# Patient Record
Sex: FEMALE | Race: WHITE
[De-identification: names, ages, dates, MRNs, and addresses within clinical notes are randomized per-mention and may not be internally consistent; named-entity substitution may affect disease eponyms.]

---

## 2021-03-24 ENCOUNTER — HOSPITAL ENCOUNTER (OUTPATIENT)
Age: 31
End: 2021-03-24
Payer: COMMERCIAL

## 2021-03-24 VITALS — BODY MASS INDEX: 28.9 KG/M2

## 2021-03-24 DIAGNOSIS — N89.8: ICD-10-CM

## 2021-03-24 DIAGNOSIS — Z12.4: Primary | ICD-10-CM

## 2021-03-24 PROCEDURE — 87205 SMEAR GRAM STAIN: CPT

## 2021-03-24 PROCEDURE — 87624 HPV HI-RISK TYP POOLED RSLT: CPT

## 2021-03-24 PROCEDURE — G0145 SCR C/V CYTO,THINLAYER,RESCR: HCPCS

## 2021-03-24 PROCEDURE — 88175 CYTOPATH C/V AUTO FLUID REDO: CPT

## 2021-03-24 PROCEDURE — 87070 CULTURE OTHR SPECIMN AEROBIC: CPT

## 2023-03-20 PROBLEM — G43.909 MIGRAINE: Status: ACTIVE | Noted: 2023-03-20

## 2023-03-20 PROBLEM — R63.5 WEIGHT GAIN: Status: ACTIVE | Noted: 2023-03-20

## 2023-03-20 PROBLEM — F43.23 SITUATIONAL MIXED ANXIETY AND DEPRESSIVE DISORDER: Status: ACTIVE | Noted: 2023-03-20

## 2023-03-20 PROBLEM — M94.0 COSTOCHONDRITIS, ACUTE: Status: ACTIVE | Noted: 2023-03-20

## 2023-03-20 PROBLEM — B00.9 HERPES SIMPLEX: Status: ACTIVE | Noted: 2023-03-20

## 2023-03-20 PROBLEM — R53.83 MALAISE AND FATIGUE: Status: ACTIVE | Noted: 2023-03-20

## 2023-03-20 PROBLEM — R53.81 MALAISE AND FATIGUE: Status: ACTIVE | Noted: 2023-03-20

## 2023-03-20 PROBLEM — E78.9 BORDERLINE HIGH CHOLESTEROL: Status: ACTIVE | Noted: 2023-03-20

## 2023-03-20 RX ORDER — ACYCLOVIR 400 MG/1
400 TABLET ORAL
COMMUNITY
Start: 2020-01-22

## 2023-03-20 RX ORDER — CITALOPRAM 10 MG/1
1 TABLET ORAL DAILY
COMMUNITY
Start: 2020-11-05 | End: 2023-03-28 | Stop reason: SDUPTHER

## 2023-03-28 DIAGNOSIS — F43.23 SITUATIONAL MIXED ANXIETY AND DEPRESSIVE DISORDER: Primary | ICD-10-CM

## 2023-03-28 RX ORDER — CITALOPRAM 10 MG/1
10 TABLET ORAL DAILY
Qty: 30 TABLET | Refills: 2 | Status: SHIPPED | OUTPATIENT
Start: 2023-03-28 | End: 2023-06-08 | Stop reason: SDUPTHER

## 2023-06-08 DIAGNOSIS — F43.23 SITUATIONAL MIXED ANXIETY AND DEPRESSIVE DISORDER: ICD-10-CM

## 2023-06-08 RX ORDER — CITALOPRAM 10 MG/1
10 TABLET ORAL DAILY
Qty: 30 TABLET | Refills: 0 | Status: SHIPPED | OUTPATIENT
Start: 2023-06-08 | End: 2023-07-13 | Stop reason: SDUPTHER

## 2023-07-13 ENCOUNTER — OFFICE VISIT (OUTPATIENT)
Dept: PRIMARY CARE | Facility: CLINIC | Age: 33
End: 2023-07-13
Payer: COMMERCIAL

## 2023-07-13 VITALS
HEIGHT: 62 IN | BODY MASS INDEX: 36.44 KG/M2 | HEART RATE: 74 BPM | SYSTOLIC BLOOD PRESSURE: 108 MMHG | WEIGHT: 198 LBS | DIASTOLIC BLOOD PRESSURE: 74 MMHG

## 2023-07-13 DIAGNOSIS — R53.81 MALAISE AND FATIGUE: ICD-10-CM

## 2023-07-13 DIAGNOSIS — R53.83 MALAISE AND FATIGUE: ICD-10-CM

## 2023-07-13 DIAGNOSIS — R79.89 LOW VITAMIN B12 LEVEL: ICD-10-CM

## 2023-07-13 DIAGNOSIS — E55.9 VITAMIN D DEFICIENCY: ICD-10-CM

## 2023-07-13 DIAGNOSIS — F43.23 SITUATIONAL MIXED ANXIETY AND DEPRESSIVE DISORDER: ICD-10-CM

## 2023-07-13 DIAGNOSIS — Z00.00 ENCOUNTER FOR WELLNESS EXAMINATION IN ADULT: Primary | ICD-10-CM

## 2023-07-13 DIAGNOSIS — E66.09 CLASS 2 OBESITY DUE TO EXCESS CALORIES WITHOUT SERIOUS COMORBIDITY WITH BODY MASS INDEX (BMI) OF 36.0 TO 36.9 IN ADULT: ICD-10-CM

## 2023-07-13 PROBLEM — M94.0 COSTOCHONDRITIS, ACUTE: Status: RESOLVED | Noted: 2023-03-20 | Resolved: 2023-07-13

## 2023-07-13 PROBLEM — E66.812 CLASS 2 OBESITY DUE TO EXCESS CALORIES WITHOUT SERIOUS COMORBIDITY WITH BODY MASS INDEX (BMI) OF 36.0 TO 36.9 IN ADULT: Status: ACTIVE | Noted: 2023-07-13

## 2023-07-13 LAB
ALANINE AMINOTRANSFERASE (SGPT) (U/L) IN SER/PLAS: 18 U/L (ref 7–45)
ALBUMIN (G/DL) IN SER/PLAS: 4.5 G/DL (ref 3.4–5)
ALKALINE PHOSPHATASE (U/L) IN SER/PLAS: 57 U/L (ref 33–110)
ANION GAP IN SER/PLAS: 11 MMOL/L (ref 10–20)
ASPARTATE AMINOTRANSFERASE (SGOT) (U/L) IN SER/PLAS: 20 U/L (ref 9–39)
BASOPHILS (10*3/UL) IN BLOOD BY AUTOMATED COUNT: 0.07 X10E9/L (ref 0–0.1)
BASOPHILS/100 LEUKOCYTES IN BLOOD BY AUTOMATED COUNT: 0.9 % (ref 0–2)
BILIRUBIN TOTAL (MG/DL) IN SER/PLAS: 0.3 MG/DL (ref 0–1.2)
CALCIDIOL (25 OH VITAMIN D3) (NG/ML) IN SER/PLAS: 31 NG/ML
CALCIUM (MG/DL) IN SER/PLAS: 9.5 MG/DL (ref 8.6–10.3)
CARBON DIOXIDE, TOTAL (MMOL/L) IN SER/PLAS: 26 MMOL/L (ref 21–32)
CHLORIDE (MMOL/L) IN SER/PLAS: 104 MMOL/L (ref 98–107)
CHOLESTEROL (MG/DL) IN SER/PLAS: 219 MG/DL (ref 0–199)
CHOLESTEROL IN HDL (MG/DL) IN SER/PLAS: 67 MG/DL
CHOLESTEROL/HDL RATIO: 3.3
COBALAMIN (VITAMIN B12) (PG/ML) IN SER/PLAS: 358 PG/ML (ref 211–911)
CREATININE (MG/DL) IN SER/PLAS: 0.7 MG/DL (ref 0.5–1.05)
EOSINOPHILS (10*3/UL) IN BLOOD BY AUTOMATED COUNT: 0.33 X10E9/L (ref 0–0.7)
EOSINOPHILS/100 LEUKOCYTES IN BLOOD BY AUTOMATED COUNT: 4.2 % (ref 0–6)
ERYTHROCYTE DISTRIBUTION WIDTH (RATIO) BY AUTOMATED COUNT: 12.8 % (ref 11.5–14.5)
ERYTHROCYTE MEAN CORPUSCULAR HEMOGLOBIN CONCENTRATION (G/DL) BY AUTOMATED: 32.1 G/DL (ref 32–36)
ERYTHROCYTE MEAN CORPUSCULAR VOLUME (FL) BY AUTOMATED COUNT: 89 FL (ref 80–100)
ERYTHROCYTES (10*6/UL) IN BLOOD BY AUTOMATED COUNT: 4.97 X10E12/L (ref 4–5.2)
ESTIMATED AVERAGE GLUCOSE FOR HBA1C: 105 MG/DL
FERRITIN (UG/LL) IN SER/PLAS: 81 UG/L (ref 8–150)
FOLATE (NG/ML) IN SER/PLAS: 16.6 NG/ML
GFR FEMALE: >90 ML/MIN/1.73M2
GLUCOSE (MG/DL) IN SER/PLAS: 86 MG/DL (ref 74–99)
HEMATOCRIT (%) IN BLOOD BY AUTOMATED COUNT: 44.2 % (ref 36–46)
HEMOGLOBIN (G/DL) IN BLOOD: 14.2 G/DL (ref 12–16)
HEMOGLOBIN A1C/HEMOGLOBIN TOTAL IN BLOOD: 5.3 %
IMMATURE GRANULOCYTES/100 LEUKOCYTES IN BLOOD BY AUTOMATED COUNT: 0.8 % (ref 0–0.9)
INSULIN: 20 UIU/ML (ref 3–25)
IRON (UG/DL) IN SER/PLAS: 72 UG/DL (ref 35–150)
IRON BINDING CAPACITY (UG/DL) IN SER/PLAS: 423 UG/DL (ref 240–445)
IRON SATURATION (%) IN SER/PLAS: 17 % (ref 25–45)
LDL: 132 MG/DL (ref 0–99)
LEUKOCYTES (10*3/UL) IN BLOOD BY AUTOMATED COUNT: 7.9 X10E9/L (ref 4.4–11.3)
LYMPHOCYTES (10*3/UL) IN BLOOD BY AUTOMATED COUNT: 2.38 X10E9/L (ref 1.2–4.8)
LYMPHOCYTES/100 LEUKOCYTES IN BLOOD BY AUTOMATED COUNT: 30.2 % (ref 13–44)
MONOCYTES (10*3/UL) IN BLOOD BY AUTOMATED COUNT: 0.69 X10E9/L (ref 0.1–1)
MONOCYTES/100 LEUKOCYTES IN BLOOD BY AUTOMATED COUNT: 8.8 % (ref 2–10)
NEUTROPHILS (10*3/UL) IN BLOOD BY AUTOMATED COUNT: 4.35 X10E9/L (ref 1.2–7.7)
NEUTROPHILS/100 LEUKOCYTES IN BLOOD BY AUTOMATED COUNT: 55.1 % (ref 40–80)
PLATELETS (10*3/UL) IN BLOOD AUTOMATED COUNT: 354 X10E9/L (ref 150–450)
POTASSIUM (MMOL/L) IN SER/PLAS: 4.5 MMOL/L (ref 3.5–5.3)
PROTEIN TOTAL: 7.3 G/DL (ref 6.4–8.2)
SODIUM (MMOL/L) IN SER/PLAS: 136 MMOL/L (ref 136–145)
THYROTROPIN (MIU/L) IN SER/PLAS BY DETECTION LIMIT <= 0.05 MIU/L: 6.23 MIU/L (ref 0.44–3.98)
THYROXINE (T4) (UG/DL) IN SER/PLAS: 6 UG/DL (ref 4.5–11.1)
THYROXINE (T4) FREE (NG/DL) IN SER/PLAS: 0.6 NG/DL (ref 0.61–1.12)
TRIGLYCERIDE (MG/DL) IN SER/PLAS: 99 MG/DL (ref 0–149)
UREA NITROGEN (MG/DL) IN SER/PLAS: 21 MG/DL (ref 6–23)
VLDL: 20 MG/DL (ref 0–40)

## 2023-07-13 PROCEDURE — 3008F BODY MASS INDEX DOCD: CPT | Performed by: PHYSICIAN ASSISTANT

## 2023-07-13 PROCEDURE — 1036F TOBACCO NON-USER: CPT | Performed by: PHYSICIAN ASSISTANT

## 2023-07-13 PROCEDURE — 99395 PREV VISIT EST AGE 18-39: CPT | Performed by: PHYSICIAN ASSISTANT

## 2023-07-13 RX ORDER — CITALOPRAM 10 MG/1
10 TABLET ORAL DAILY
Qty: 30 TABLET | Refills: 11 | Status: SHIPPED | OUTPATIENT
Start: 2023-07-13 | End: 2024-02-01 | Stop reason: SDUPTHER

## 2023-07-13 RX ORDER — ERGOCALCIFEROL 1.25 MG/1
50000 CAPSULE ORAL
Qty: 5 CAPSULE | Refills: 11 | Status: SHIPPED | OUTPATIENT
Start: 2023-07-13 | End: 2024-06-04 | Stop reason: SDUPTHER

## 2023-07-13 RX ORDER — LANOLIN ALCOHOL/MO/W.PET/CERES
1000 CREAM (GRAM) TOPICAL DAILY
Qty: 30 TABLET | Refills: 11 | Status: SHIPPED | OUTPATIENT
Start: 2023-07-13 | End: 2024-06-04 | Stop reason: SDUPTHER

## 2023-07-13 ASSESSMENT — ENCOUNTER SYMPTOMS
RHINORRHEA: 0
ABDOMINAL PAIN: 0
NUMBNESS: 0
SHORTNESS OF BREATH: 0
EYE DISCHARGE: 0
VOMITING: 0
BACK PAIN: 0
DIARRHEA: 0
TREMORS: 0
CHEST TIGHTNESS: 0
DIZZINESS: 0
NECK PAIN: 0
HEADACHES: 0
SORE THROAT: 0
COUGH: 0
BRUISES/BLEEDS EASILY: 0
CHILLS: 0
PALPITATIONS: 0
EYE REDNESS: 0
FATIGUE: 1
CONFUSION: 0
WHEEZING: 0
CONSTIPATION: 0
FEVER: 0
FREQUENCY: 0
NAUSEA: 0
WOUND: 0
SINUS PAIN: 0
FLANK PAIN: 0
SLEEP DISTURBANCE: 0

## 2023-07-13 ASSESSMENT — PATIENT HEALTH QUESTIONNAIRE - PHQ9
SUM OF ALL RESPONSES TO PHQ9 QUESTIONS 1 AND 2: 0
2. FEELING DOWN, DEPRESSED OR HOPELESS: NOT AT ALL
1. LITTLE INTEREST OR PLEASURE IN DOING THINGS: NOT AT ALL

## 2023-07-13 NOTE — PROGRESS NOTES
Subjective   Patient ID: Sandra Crouch is a 32 y.o. female who presents for annual wellness (Rev labs )    HPI  Wellness   -Labs   - med check   herpes -stable on acyclovir prn. - denies needing new script      mood  celexa - doing well     -Preventative Testing y  PAP - Following with GYN last PAP -2022- Lisette Teresa - Peg  Mammo- fam hx of breast Cancer but in older age - suggest age 40   DEXA - suggest 55   Colonoscopy - Suggest age 45-50   Fall - No July 2023   PHQ2 - No July 2023     -Other Providers   GYN - Lisette Teresa  EYE- visit set in Aug - follows annually   Dentist - every 6 months     -Vaccinations  Flu - declines  PNA- suggest age 65  Tdap -2018?  Hep B - low risk   MMR  - vaccine as a child   Shingles - suggest age 50  Covid - first 2 shots    Patient Active Problem List   Diagnosis    Borderline high cholesterol    Costochondritis, acute    Herpes simplex    Malaise and fatigue    Migraine    Situational mixed anxiety and depressive disorder    Weight gain       Review of Systems   Constitutional:  Positive for fatigue. Negative for chills and fever.   HENT:  Negative for congestion, rhinorrhea, sinus pain, sore throat and tinnitus.    Eyes:  Negative for discharge, redness and visual disturbance.   Respiratory:  Negative for cough, chest tightness, shortness of breath and wheezing.    Cardiovascular:  Negative for chest pain, palpitations and leg swelling.   Gastrointestinal:  Negative for abdominal pain, constipation, diarrhea, nausea and vomiting.   Endocrine: Negative for cold intolerance and heat intolerance.   Genitourinary:  Negative for flank pain, frequency and urgency.   Musculoskeletal:  Negative for back pain, gait problem and neck pain.   Skin:  Negative for rash and wound.   Neurological:  Negative for dizziness, tremors, syncope, numbness and headaches.   Hematological:  Does not bruise/bleed easily.   Psychiatric/Behavioral:  Negative for confusion, sleep disturbance  "and suicidal ideas.        Past Medical History:   Diagnosis Date    Encounter for gynecological examination (general) (routine) without abnormal findings     Pap test, as part of routine gynecological examination    Other conditions influencing health status     Menarche    Other specified postprocedural states     History of Papanicolaou smear    Personal history of other diseases of the female genital tract     History of abnormal cervical Papanicolaou smear       Past Surgical History:   Procedure Laterality Date    COLPOSCOPY  03/29/2013       Family History   Problem Relation Name Age of Onset    Thyroid disease Mother      No Known Problems Father      Emphysema Other      Breast cancer Other         Social History     Tobacco Use    Smoking status: Never    Smokeless tobacco: Never   Substance Use Topics    Alcohol use: Never    Drug use: Never       No Known Allergies    Current Outpatient Medications   Medication Sig Dispense Refill    acyclovir (Zovirax) 400 mg tablet Take 1 tablet (400 mg) by mouth. 1 TAB TID PRN      citalopram (CeleXA) 10 mg tablet Take 1 tablet (10 mg) by mouth once daily. 30 tablet 0     No current facility-administered medications for this visit.       Objective   /74 (BP Location: Left arm, Patient Position: Sitting)   Pulse 74   Ht 1.575 m (5' 2\")   Wt 89.8 kg (198 lb)   BMI 36.21 kg/m²     Physical Exam  Vitals reviewed.   Constitutional:       Appearance: Normal appearance. She is obese.   HENT:      Head: Normocephalic.      Right Ear: External ear normal.      Left Ear: External ear normal.      Nose: Nose normal. No congestion or rhinorrhea.      Mouth/Throat:      Mouth: Mucous membranes are moist.   Eyes:      Extraocular Movements: Extraocular movements intact.      Conjunctiva/sclera: Conjunctivae normal.      Pupils: Pupils are equal, round, and reactive to light.   Cardiovascular:      Rate and Rhythm: Normal rate and regular rhythm.      Pulses: Normal " pulses.   Pulmonary:      Effort: Pulmonary effort is normal.      Breath sounds: Normal breath sounds.   Abdominal:      General: Bowel sounds are normal.      Palpations: Abdomen is soft.      Tenderness: There is no abdominal tenderness. There is no right CVA tenderness or left CVA tenderness.   Musculoskeletal:         General: No tenderness. Normal range of motion.      Cervical back: Normal range of motion and neck supple. No tenderness.   Skin:     General: Skin is warm and dry.   Neurological:      General: No focal deficit present.      Mental Status: She is alert and oriented to person, place, and time.   Psychiatric:         Mood and Affect: Mood normal.         Behavior: Behavior normal.         Testing   Component      Latest Ref Rn 7/13/2023   WBC      4.4 - 11.3 x10E9/L 7.9    RBC      4.00 - 5.20 x10E12/L 4.97    HEMOGLOBIN      12.0 - 16.0 g/dL 14.2    HEMATOCRIT      36.0 - 46.0 % 44.2    MCV      80 - 100 fL 89    MCHC      32.0 - 36.0 g/dL 32.1    Platelets      150 - 450 x10E9/L 354    RED CELL DISTRIBUTION WIDTH      11.5 - 14.5 % 12.8    Neutrophils %      40.0 - 80.0 % 55.1    Immature Granulocytes %, Automated      0.0 - 0.9 % 0.8    Lymphocytes %      13.0 - 44.0 % 30.2    Monocytes %      2.0 - 10.0 % 8.8    Eosinophils %      0.0 - 6.0 % 4.2    Basophils %      0.0 - 2.0 % 0.9    Neutrophils Absolute      1.20 - 7.70 x10E9/L 4.35    Lymphocytes Absolute      1.20 - 4.80 x10E9/L 2.38    Monocytes Absolute      0.10 - 1.00 x10E9/L 0.69    Eosinophils Absolute      0.00 - 0.70 x10E9/L 0.33    Basophils Absolute      0.00 - 0.10 x10E9/L 0.07    Hemoglobin A1C      % 5.3    Estimated Average Glucose      MG/    Vitamin D, 25-Hydroxy, Total      ng/mL 31    FOLATE      >5.0 ng/mL 16.6    Vitamin B12      211 - 911 pg/mL 358          Impression    MDM    1) COMPLEXITY: MORE THAN 1 STABLE CHRONIC CONDITION ADDRESSED  2)DATA: TESTS INTERPRETED AND OR ORDERED, TOOK INDEPENDENT HISTORY OR  RECORDS REVIEWED  3)RISK: MODERATE RISK DUE TO NATURE OF MEDICAL CONDITIONS/COMORBIDITY OR MEDICATIONS ORDERED OR SURGICAL OR PROCEDURE REFERRAL, .       Reviewed labs and Testing on file   Patient to follow diet low in cholesterol, fat, and sodium.    Patient is advised to increase Exercise.  Patient is recommended to lose weight.  Reviewed Meds and discussed common side effects  Continue as directed   Vit D - start weekly supplement and repeat in 3-6 mo  B12 - start daily supplement and repeat in 3-6 mo  Cont with specialists   Pt to call for remaining lab results that were not back - CMP and lipid   Aim for wellness in 1 year if approp based on labs   Patient is strongly advised to be compliant with recommendations.    Return to Clinic sooner if needed.  Patient denies further questions/concerns at this time     Assessment/Plan   Problem List Items Addressed This Visit       Malaise and fatigue    Situational mixed anxiety and depressive disorder    Relevant Medications    citalopram (CeleXA) 10 mg tablet    Low vitamin B12 level    Relevant Medications    cyanocobalamin (Vitamin B-12) 1,000 mcg tablet    Other Relevant Orders    Vitamin B12    Vitamin D deficiency    Relevant Medications    ergocalciferol (Vitamin D-2) 1.25 MG (63654 UT) capsule    Other Relevant Orders    Vitamin D, Total    Class 2 obesity due to excess calories without serious comorbidity with body mass index (BMI) of 36.0 to 36.9 in adult     Other Visit Diagnoses       Encounter for wellness examination in adult    -  Primary             FU in 6 mo with labs at [facility] non fasting

## 2023-07-14 LAB — TRIIODOTHYRONINE (T3) (NG/DL) IN SER/PLAS: 148 NG/DL (ref 60–200)

## 2024-02-01 ENCOUNTER — OFFICE VISIT (OUTPATIENT)
Dept: PRIMARY CARE | Facility: CLINIC | Age: 34
End: 2024-02-01
Payer: COMMERCIAL

## 2024-02-01 VITALS
SYSTOLIC BLOOD PRESSURE: 103 MMHG | HEIGHT: 62 IN | WEIGHT: 194 LBS | BODY MASS INDEX: 35.7 KG/M2 | DIASTOLIC BLOOD PRESSURE: 68 MMHG | OXYGEN SATURATION: 98 % | HEART RATE: 78 BPM

## 2024-02-01 DIAGNOSIS — E78.00 HYPERCHOLESTEREMIA: ICD-10-CM

## 2024-02-01 DIAGNOSIS — E66.09 CLASS 2 OBESITY DUE TO EXCESS CALORIES WITHOUT SERIOUS COMORBIDITY WITH BODY MASS INDEX (BMI) OF 35.0 TO 35.9 IN ADULT: ICD-10-CM

## 2024-02-01 DIAGNOSIS — R79.89 LOW VITAMIN B12 LEVEL: ICD-10-CM

## 2024-02-01 DIAGNOSIS — R79.89 ELEVATED TSH: ICD-10-CM

## 2024-02-01 DIAGNOSIS — E55.9 VITAMIN D DEFICIENCY: Primary | ICD-10-CM

## 2024-02-01 DIAGNOSIS — F43.23 SITUATIONAL MIXED ANXIETY AND DEPRESSIVE DISORDER: ICD-10-CM

## 2024-02-01 PROCEDURE — 1036F TOBACCO NON-USER: CPT | Performed by: PHYSICIAN ASSISTANT

## 2024-02-01 PROCEDURE — 3008F BODY MASS INDEX DOCD: CPT | Performed by: PHYSICIAN ASSISTANT

## 2024-02-01 PROCEDURE — 99214 OFFICE O/P EST MOD 30 MIN: CPT | Performed by: PHYSICIAN ASSISTANT

## 2024-02-01 RX ORDER — CITALOPRAM 40 MG/1
40 TABLET, FILM COATED ORAL DAILY
Qty: 30 TABLET | Refills: 11 | Status: SHIPPED | OUTPATIENT
Start: 2024-02-01

## 2024-02-01 ASSESSMENT — ENCOUNTER SYMPTOMS
SINUS PAIN: 0
DYSPHORIC MOOD: 1
WHEEZING: 0
CHILLS: 0
ABDOMINAL PAIN: 0
PALPITATIONS: 0
FLANK PAIN: 0
RHINORRHEA: 0
EYE REDNESS: 0
FREQUENCY: 0
COUGH: 0
SORE THROAT: 0
NAUSEA: 0
NECK PAIN: 0
EYE DISCHARGE: 0
CONSTIPATION: 0
BRUISES/BLEEDS EASILY: 0
NUMBNESS: 0
BACK PAIN: 0
CONFUSION: 0
HEADACHES: 0
CHEST TIGHTNESS: 0
NERVOUS/ANXIOUS: 1
FEVER: 0
SHORTNESS OF BREATH: 0
SLEEP DISTURBANCE: 0
DIARRHEA: 0
FATIGUE: 1
DIZZINESS: 0
VOMITING: 0
WOUND: 0
TREMORS: 0

## 2024-02-01 ASSESSMENT — PATIENT HEALTH QUESTIONNAIRE - PHQ9
2. FEELING DOWN, DEPRESSED OR HOPELESS: NOT AT ALL
1. LITTLE INTEREST OR PLEASURE IN DOING THINGS: NOT AT ALL
SUM OF ALL RESPONSES TO PHQ9 QUESTIONS 1 AND 2: 0

## 2024-02-01 NOTE — PROGRESS NOTES
Subjective   Patient ID: Sandra Crouch is a 33 y.o. female who presents for Follow-up (6 MONTH F/U WITH LABS. NO CONCERNS)    HPI    -Labs - not done - she did do labs the same day as her last visit and states not all labs were processed at that time   - med check   herpes -stable on acyclovir prn. - denies needing new script       mood  celexa - doing well - has been taking 20 mg and would like to inc      Vit D   B12 -     -Preventative Testing y  PAP - Following with GYN last PAP -2022- Lisette Teresa - Peg  Mammo- fam hx of breast Cancer but in older age - suggest age 40   DEXA - suggest 55   Colonoscopy - Suggest age 45-50   Fall - No JAN 2024    PHQ2 - No Jan 2024      -Other Providers   GYN - Lisette Teresa  EYE- visit set in Aug - follows annually   Dentist - every 6 months       Patient Active Problem List   Diagnosis    Borderline high cholesterol    Herpes simplex    Malaise and fatigue    Migraine    Situational mixed anxiety and depressive disorder    Weight gain    Low vitamin B12 level    Vitamin D deficiency    Class 2 obesity due to excess calories without serious comorbidity with body mass index (BMI) of 36.0 to 36.9 in adult       Review of Systems   Constitutional:  Positive for fatigue. Negative for chills and fever.   HENT:  Negative for congestion, rhinorrhea, sinus pain, sore throat and tinnitus.    Eyes:  Negative for discharge, redness and visual disturbance.   Respiratory:  Negative for cough, chest tightness, shortness of breath and wheezing.    Cardiovascular:  Negative for chest pain, palpitations and leg swelling.   Gastrointestinal:  Negative for abdominal pain, constipation, diarrhea, nausea and vomiting.   Endocrine: Negative for cold intolerance and heat intolerance.   Genitourinary:  Negative for flank pain, frequency and urgency.   Musculoskeletal:  Negative for back pain, gait problem and neck pain.   Skin:  Negative for rash and wound.   Neurological:  Negative for  "dizziness, tremors, syncope, numbness and headaches.   Hematological:  Does not bruise/bleed easily.   Psychiatric/Behavioral:  Positive for dysphoric mood. Negative for confusion, sleep disturbance and suicidal ideas. The patient is nervous/anxious.        Past Medical History:   Diagnosis Date    Encounter for gynecological examination (general) (routine) without abnormal findings     Pap test, as part of routine gynecological examination    Other conditions influencing health status     Menarche    Other specified postprocedural states     History of Papanicolaou smear    Personal history of other diseases of the female genital tract     History of abnormal cervical Papanicolaou smear       Past Surgical History:   Procedure Laterality Date    COLPOSCOPY  03/29/2013       Family History   Problem Relation Name Age of Onset    Thyroid disease Mother      No Known Problems Father      Emphysema Other      Breast cancer Other         Social History     Tobacco Use    Smoking status: Never    Smokeless tobacco: Never   Vaping Use    Vaping Use: Never used   Substance Use Topics    Alcohol use: Never    Drug use: Never       No Known Allergies    Current Outpatient Medications   Medication Sig Dispense Refill    acyclovir (Zovirax) 400 mg tablet Take 1 tablet (400 mg) by mouth. 1 TAB TID PRN      citalopram (CeleXA) 10 mg tablet Take 1 tablet (10 mg) by mouth once daily. 30 tablet 11    cyanocobalamin (Vitamin B-12) 1,000 mcg tablet Take 1 tablet (1,000 mcg) by mouth once daily. 30 tablet 11    ergocalciferol (Vitamin D-2) 1.25 MG (98512 UT) capsule Take 1 capsule (50,000 Units) by mouth 1 (one) time per week. 5 capsule 11     No current facility-administered medications for this visit.       Objective   /68   Pulse 78   Ht 1.575 m (5' 2\")   Wt 88 kg (194 lb)   SpO2 98%   BMI 35.48 kg/m²     Physical Exam  Vitals reviewed.   Constitutional:       Appearance: Normal appearance. She is obese.   HENT:      " Head: Normocephalic.      Right Ear: External ear normal.      Left Ear: External ear normal.      Nose: Nose normal. No congestion or rhinorrhea.      Mouth/Throat:      Mouth: Mucous membranes are moist.   Eyes:      Extraocular Movements: Extraocular movements intact.      Conjunctiva/sclera: Conjunctivae normal.      Pupils: Pupils are equal, round, and reactive to light.   Cardiovascular:      Rate and Rhythm: Normal rate and regular rhythm.      Pulses: Normal pulses.   Pulmonary:      Effort: Pulmonary effort is normal.      Breath sounds: Normal breath sounds.   Abdominal:      General: Bowel sounds are normal.      Palpations: Abdomen is soft.      Tenderness: There is no abdominal tenderness. There is no right CVA tenderness or left CVA tenderness.   Musculoskeletal:         General: No tenderness. Normal range of motion.      Cervical back: Normal range of motion and neck supple. No tenderness.   Skin:     General: Skin is warm and dry.   Neurological:      General: No focal deficit present.      Mental Status: She is alert and oriented to person, place, and time.   Psychiatric:         Mood and Affect: Mood normal.         Behavior: Behavior normal.       Testing   Component      Latest Ref [facility] 7/13/2023   WBC      4.4 - 11.3 x10E9/L 7.9    RBC      4.00 - 5.20 x10E12/L 4.97    HEMOGLOBIN      12.0 - 16.0 g/dL 14.2    HEMATOCRIT      36.0 - 46.0 % 44.2    MCV      80 - 100 fL 89    MCHC      32.0 - 36.0 g/dL 32.1    Platelets      150 - 450 x10E9/L 354    RED CELL DISTRIBUTION WIDTH      11.5 - 14.5 % 12.8    Neutrophils %      40.0 - 80.0 % 55.1    Immature Granulocytes %, Automated      0.0 - 0.9 % 0.8    Lymphocytes %      13.0 - 44.0 % 30.2    Monocytes %      2.0 - 10.0 % 8.8    Eosinophils %      0.0 - 6.0 % 4.2    Basophils %      0.0 - 2.0 % 0.9    Neutrophils Absolute      1.20 - 7.70 x10E9/L 4.35    Lymphocytes Absolute      1.20 - 4.80 x10E9/L 2.38    Monocytes Absolute      0.10 - 1.00  x10E9/L 0.69    Eosinophils Absolute      0.00 - 0.70 x10E9/L 0.33    Basophils Absolute      0.00 - 0.10 x10E9/L 0.07    GLUCOSE      74 - 99 mg/dL 86    SODIUM      136 - 145 mmol/L 136    POTASSIUM      3.5 - 5.3 mmol/L 4.5    CHLORIDE      98 - 107 mmol/L 104    Bicarbonate      21 - 32 mmol/L 26    Anion Gap      10 - 20 mmol/L 11    Blood Urea Nitrogen      6 - 23 mg/dL 21    Creatinine      0.50 - 1.05 mg/dL 0.70    GFR Female      >90 mL/min/1.73m2 >90    Calcium      8.6 - 10.3 mg/dL 9.5    Albumin      3.4 - 5.0 g/dL 4.5    Alkaline Phosphatase      33 - 110 U/L 57    Total Protein      6.4 - 8.2 g/dL 7.3    AST      9 - 39 U/L 20    Bilirubin Total      0.0 - 1.2 mg/dL 0.3    ALT      7 - 45 U/L 18    CHOLESTEROL      0 - 199 mg/dL 219 (H)    HDL CHOLESTEROL      mg/dL 67.0    Cholesterol/HDL Ratio 3.3    LDL Calculated      0 - 99 mg/dL 132 (H)    VLDL      0 - 40 mg/dL 20    TRIGLYCERIDES      0 - 149 mg/dL 99    IRON      35 - 150 ug/dL 72    TIBC      240 - 445 ug/dL 423    % Saturation      25 - 45 % 17 (L)    Hemoglobin A1C      % 5.3    Estimated Average Glucose      MG/    Vitamin D, 25-Hydroxy, Total      ng/mL 31    FOLATE      >5.0 ng/mL 16.6    Vitamin B12      211 - 911 pg/mL 358    FERRITIN      8 - 150 ug/L 81    Insulin      3 - 25 uIU/mL 20    Thyroxine      4.5 - 11.1 ug/dL 6.0    Triiodothyronine      60 - 200 ng/dL 148    Thyroid Stimulating Hormone      0.44 - 3.98 mIU/L 6.23 (H)    Thyroxine, Free      0.61 - 1.12 ng/dL 0.60 (L)           Impression    MDM    1) COMPLEXITY: MORE THAN 1 STABLE CHRONIC CONDITION ADDRESSED  2)DATA: TESTS INTERPRETED AND OR ORDERED, TOOK INDEPENDENT HISTORY OR RECORDS REVIEWED  3)RISK: MODERATE RISK DUE TO NATURE OF MEDICAL CONDITIONS/COMORBIDITY OR MEDICATIONS ORDERED OR SURGICAL OR PROCEDURE REFERRAL, .       Reviewed labs and Testing on file   Patient to follow diet low in cholesterol, fat, and sodium.    Patient is advised to increase  Exercise.  Patient is recommended to lose weight.  Reviewed Meds and discussed common side effects  Continue as directed   Advised we get updated labs given thyroid labs and some of her symptoms  Recheck vit levels since starting supplement   Celexa - inc to 40   Patient is strongly advised to be compliant with recommendations.    Return to Clinic sooner if needed.  Patient denies further questions/concerns at this time      Assessment/Plan   Problem List Items Addressed This Visit             ICD-10-CM    Situational mixed anxiety and depressive disorder F43.23    Relevant Medications    citalopram (CeleXA) 40 mg tablet    Low vitamin B12 level R79.89    Relevant Orders    Vitamin B12    Vitamin D deficiency - Primary E55.9    Relevant Orders    Vitamin D 25-Hydroxy,Total (for eval of Vitamin D levels)    Class 2 obesity due to excess calories without serious comorbidity with body mass index (BMI) of 35.0 to 35.9 in adult E66.09, Z68.35     Other Visit Diagnoses         Codes    Hypercholesteremia     E78.00    Relevant Orders    CBC and Auto Differential    Comprehensive Metabolic Panel    Lipid Panel    Thyroid Stimulating Hormone    Thyroxine, Free    Magnesium    Vitamin B12    Vitamin D 25-Hydroxy,Total (for eval of Vitamin D levels)    Elevated TSH     R79.89    Relevant Orders    Thyroid Stimulating Hormone    Thyroxine, Free             FU in 1-4 weeks with labs at [facility] fasting and med check

## 2024-02-27 ENCOUNTER — LAB (OUTPATIENT)
Dept: LAB | Facility: LAB | Age: 34
End: 2024-02-27
Payer: COMMERCIAL

## 2024-02-27 DIAGNOSIS — R79.89 ELEVATED TSH: ICD-10-CM

## 2024-02-27 DIAGNOSIS — R79.89 LOW VITAMIN B12 LEVEL: ICD-10-CM

## 2024-02-27 DIAGNOSIS — E55.9 VITAMIN D DEFICIENCY: ICD-10-CM

## 2024-02-27 DIAGNOSIS — E78.00 HYPERCHOLESTEREMIA: ICD-10-CM

## 2024-02-27 LAB
25(OH)D3 SERPL-MCNC: 36 NG/ML (ref 30–100)
ALBUMIN SERPL BCP-MCNC: 4.1 G/DL (ref 3.4–5)
ALP SERPL-CCNC: 62 U/L (ref 33–110)
ALT SERPL W P-5'-P-CCNC: 18 U/L (ref 7–45)
ANION GAP SERPL CALC-SCNC: 13 MMOL/L (ref 10–20)
AST SERPL W P-5'-P-CCNC: 20 U/L (ref 9–39)
BASOPHILS # BLD AUTO: 0.08 X10*3/UL (ref 0–0.1)
BASOPHILS NFR BLD AUTO: 0.9 %
BILIRUB SERPL-MCNC: 0.3 MG/DL (ref 0–1.2)
BUN SERPL-MCNC: 18 MG/DL (ref 6–23)
CALCIUM SERPL-MCNC: 8.9 MG/DL (ref 8.6–10.3)
CHLORIDE SERPL-SCNC: 103 MMOL/L (ref 98–107)
CHOLEST SERPL-MCNC: 193 MG/DL (ref 0–199)
CHOLESTEROL/HDL RATIO: 3.2
CO2 SERPL-SCNC: 26 MMOL/L (ref 21–32)
CREAT SERPL-MCNC: 0.71 MG/DL (ref 0.5–1.05)
EGFRCR SERPLBLD CKD-EPI 2021: >90 ML/MIN/1.73M*2
EOSINOPHIL # BLD AUTO: 0.49 X10*3/UL (ref 0–0.7)
EOSINOPHIL NFR BLD AUTO: 5.5 %
ERYTHROCYTE [DISTWIDTH] IN BLOOD BY AUTOMATED COUNT: 12.7 % (ref 11.5–14.5)
GLUCOSE SERPL-MCNC: 87 MG/DL (ref 74–99)
HCT VFR BLD AUTO: 41.1 % (ref 36–46)
HDLC SERPL-MCNC: 60 MG/DL
HGB BLD-MCNC: 13.5 G/DL (ref 12–16)
IMM GRANULOCYTES # BLD AUTO: 0.05 X10*3/UL (ref 0–0.7)
IMM GRANULOCYTES NFR BLD AUTO: 0.6 % (ref 0–0.9)
LDLC SERPL CALC-MCNC: 115 MG/DL
LYMPHOCYTES # BLD AUTO: 2.37 X10*3/UL (ref 1.2–4.8)
LYMPHOCYTES NFR BLD AUTO: 26.5 %
MAGNESIUM SERPL-MCNC: 2.03 MG/DL (ref 1.6–2.4)
MCH RBC QN AUTO: 28.6 PG (ref 26–34)
MCHC RBC AUTO-ENTMCNC: 32.8 G/DL (ref 32–36)
MCV RBC AUTO: 87 FL (ref 80–100)
MONOCYTES # BLD AUTO: 0.69 X10*3/UL (ref 0.1–1)
MONOCYTES NFR BLD AUTO: 7.7 %
NEUTROPHILS # BLD AUTO: 5.25 X10*3/UL (ref 1.2–7.7)
NEUTROPHILS NFR BLD AUTO: 58.8 %
NON HDL CHOLESTEROL: 133 MG/DL (ref 0–149)
NRBC BLD-RTO: 0 /100 WBCS (ref 0–0)
PLATELET # BLD AUTO: 390 X10*3/UL (ref 150–450)
POTASSIUM SERPL-SCNC: 4.2 MMOL/L (ref 3.5–5.3)
PROT SERPL-MCNC: 6.9 G/DL (ref 6.4–8.2)
RBC # BLD AUTO: 4.72 X10*6/UL (ref 4–5.2)
SODIUM SERPL-SCNC: 138 MMOL/L (ref 136–145)
T4 FREE SERPL-MCNC: 0.56 NG/DL (ref 0.61–1.12)
TRIGL SERPL-MCNC: 91 MG/DL (ref 0–149)
TSH SERPL-ACNC: 4.64 MIU/L (ref 0.44–3.98)
VIT B12 SERPL-MCNC: 1235 PG/ML (ref 211–911)
VLDL: 18 MG/DL (ref 0–40)
WBC # BLD AUTO: 8.9 X10*3/UL (ref 4.4–11.3)

## 2024-02-27 PROCEDURE — 85025 COMPLETE CBC W/AUTO DIFF WBC: CPT

## 2024-02-27 PROCEDURE — 36415 COLL VENOUS BLD VENIPUNCTURE: CPT

## 2024-02-27 PROCEDURE — 82607 VITAMIN B-12: CPT

## 2024-02-27 PROCEDURE — 83735 ASSAY OF MAGNESIUM: CPT

## 2024-02-27 PROCEDURE — 82306 VITAMIN D 25 HYDROXY: CPT

## 2024-02-27 PROCEDURE — 80053 COMPREHEN METABOLIC PANEL: CPT

## 2024-02-27 PROCEDURE — 80061 LIPID PANEL: CPT

## 2024-02-27 PROCEDURE — 84439 ASSAY OF FREE THYROXINE: CPT

## 2024-02-27 PROCEDURE — 84443 ASSAY THYROID STIM HORMONE: CPT

## 2024-02-29 ENCOUNTER — OFFICE VISIT (OUTPATIENT)
Dept: PRIMARY CARE | Facility: CLINIC | Age: 34
End: 2024-02-29
Payer: COMMERCIAL

## 2024-02-29 VITALS
BODY MASS INDEX: 37.36 KG/M2 | WEIGHT: 203 LBS | HEIGHT: 62 IN | DIASTOLIC BLOOD PRESSURE: 60 MMHG | SYSTOLIC BLOOD PRESSURE: 120 MMHG

## 2024-02-29 DIAGNOSIS — E66.09 CLASS 2 OBESITY DUE TO EXCESS CALORIES WITHOUT SERIOUS COMORBIDITY WITH BODY MASS INDEX (BMI) OF 37.0 TO 37.9 IN ADULT: ICD-10-CM

## 2024-02-29 DIAGNOSIS — F43.23 SITUATIONAL MIXED ANXIETY AND DEPRESSIVE DISORDER: ICD-10-CM

## 2024-02-29 DIAGNOSIS — E03.9 ACQUIRED HYPOTHYROIDISM: Primary | ICD-10-CM

## 2024-02-29 DIAGNOSIS — E55.9 VITAMIN D DEFICIENCY: ICD-10-CM

## 2024-02-29 DIAGNOSIS — R79.89 LOW VITAMIN B12 LEVEL: ICD-10-CM

## 2024-02-29 PROBLEM — E78.00 HYPERCHOLESTEROLEMIA: Status: RESOLVED | Noted: 2024-02-29 | Resolved: 2024-02-29

## 2024-02-29 PROCEDURE — 3008F BODY MASS INDEX DOCD: CPT | Performed by: PHYSICIAN ASSISTANT

## 2024-02-29 PROCEDURE — 1036F TOBACCO NON-USER: CPT | Performed by: PHYSICIAN ASSISTANT

## 2024-02-29 PROCEDURE — 99214 OFFICE O/P EST MOD 30 MIN: CPT | Performed by: PHYSICIAN ASSISTANT

## 2024-02-29 RX ORDER — LEVOTHYROXINE SODIUM 25 UG/1
25 TABLET ORAL DAILY
Qty: 30 TABLET | Refills: 11 | Status: SHIPPED | OUTPATIENT
Start: 2024-02-29 | End: 2024-06-04 | Stop reason: SDUPTHER

## 2024-02-29 ASSESSMENT — ENCOUNTER SYMPTOMS
NECK PAIN: 0
BACK PAIN: 0
WOUND: 0
EYE REDNESS: 0
FLANK PAIN: 0
RHINORRHEA: 0
DIZZINESS: 0
FATIGUE: 1
CONSTIPATION: 0
NAUSEA: 0
NERVOUS/ANXIOUS: 1
NUMBNESS: 0
SINUS PAIN: 0
DIARRHEA: 0
HEADACHES: 0
SORE THROAT: 0
SLEEP DISTURBANCE: 0
BRUISES/BLEEDS EASILY: 0
WHEEZING: 0
PALPITATIONS: 0
COUGH: 0
SHORTNESS OF BREATH: 0
CHEST TIGHTNESS: 0
DYSPHORIC MOOD: 1
EYE DISCHARGE: 0
ABDOMINAL PAIN: 0
VOMITING: 0
CHILLS: 0
CONFUSION: 0
TREMORS: 0
FREQUENCY: 0
FEVER: 0

## 2024-02-29 ASSESSMENT — PATIENT HEALTH QUESTIONNAIRE - PHQ9
1. LITTLE INTEREST OR PLEASURE IN DOING THINGS: NOT AT ALL
SUM OF ALL RESPONSES TO PHQ9 QUESTIONS 1 AND 2: 0
2. FEELING DOWN, DEPRESSED OR HOPELESS: NOT AT ALL

## 2024-02-29 NOTE — PROGRESS NOTES
Subjective   Patient ID: Sandra Crouch is a 33 y.o. female who presents for Follow-up (1 MONTH FOLLOW UP)    HPI      -Labs -     - med check   herpes -stable on acyclovir prn. - denies needing new script       mood  celexa - doing well -     Vit D - 1/week   B12 - stop x 1-2 mo and then consider starting few times/week      -Preventative Testing y  PAP - Following with GYN last PAP -2022- Lisette Teresa - Peg  Mammo- fam hx of breast Cancer but in older age - suggest age 40   DEXA - suggest 55   Colonoscopy - Suggest age 45-50   Fall - No JAN 2024    PHQ2 - No Jan 2024      -Other Providers   GYN - Lisette Teresa  EYE- visit set in Aug - follows annually   Dentist - every 6 months     Patient Active Problem List   Diagnosis    Borderline high cholesterol    Herpes simplex    Malaise and fatigue    Migraine    Situational mixed anxiety and depressive disorder    Weight gain    Low vitamin B12 level    Vitamin D deficiency    Class 2 obesity due to excess calories without serious comorbidity with body mass index (BMI) of 35.0 to 35.9 in adult       Review of Systems   Constitutional:  Positive for fatigue. Negative for chills and fever.   HENT:  Negative for congestion, rhinorrhea, sinus pain, sore throat and tinnitus.    Eyes:  Negative for discharge, redness and visual disturbance.   Respiratory:  Negative for cough, chest tightness, shortness of breath and wheezing.    Cardiovascular:  Negative for chest pain, palpitations and leg swelling.   Gastrointestinal:  Negative for abdominal pain, constipation, diarrhea, nausea and vomiting.   Endocrine: Negative for cold intolerance and heat intolerance.   Genitourinary:  Negative for flank pain, frequency and urgency.   Musculoskeletal:  Negative for back pain, gait problem and neck pain.   Skin:  Negative for rash and wound.   Neurological:  Negative for dizziness, tremors, syncope, numbness and headaches.   Hematological:  Does not bruise/bleed easily.  "  Psychiatric/Behavioral:  Positive for dysphoric mood. Negative for confusion, sleep disturbance and suicidal ideas. The patient is nervous/anxious.        Past Medical History:   Diagnosis Date    Encounter for gynecological examination (general) (routine) without abnormal findings     Pap test, as part of routine gynecological examination    High thyroid stimulating hormone (TSH) level 02/29/2024    Hypercholesterolemia 02/29/2024    Other conditions influencing health status     Menarche    Other specified postprocedural states     History of Papanicolaou smear    Personal history of other diseases of the female genital tract     History of abnormal cervical Papanicolaou smear       Past Surgical History:   Procedure Laterality Date    COLPOSCOPY  03/29/2013       Family History   Problem Relation Name Age of Onset    Thyroid disease Mother      No Known Problems Father      Emphysema Other      Breast cancer Other         Social History     Tobacco Use    Smoking status: Never    Smokeless tobacco: Never   Vaping Use    Vaping Use: Never used   Substance Use Topics    Alcohol use: Never    Drug use: Never       No Known Allergies    Current Outpatient Medications   Medication Sig Dispense Refill    acyclovir (Zovirax) 400 mg tablet Take 1 tablet (400 mg) by mouth. 1 TAB TID PRN      citalopram (CeleXA) 40 mg tablet Take 1 tablet (40 mg) by mouth once daily. 30 tablet 11    cyanocobalamin (Vitamin B-12) 1,000 mcg tablet Take 1 tablet (1,000 mcg) by mouth once daily. 30 tablet 11    ergocalciferol (Vitamin D-2) 1.25 MG (85989 UT) capsule Take 1 capsule (50,000 Units) by mouth 1 (one) time per week. 5 capsule 11     No current facility-administered medications for this visit.       Objective   /60   Ht 1.575 m (5' 2\")   Wt 92.1 kg (203 lb)   BMI 37.13 kg/m²     Physical Exam  Vitals reviewed.   Constitutional:       Appearance: Normal appearance. She is obese.   HENT:      Head: Normocephalic.      Right " Ear: External ear normal.      Left Ear: External ear normal.      Nose: Nose normal. No congestion or rhinorrhea.      Mouth/Throat:      Mouth: Mucous membranes are moist.   Eyes:      Extraocular Movements: Extraocular movements intact.      Conjunctiva/sclera: Conjunctivae normal.      Pupils: Pupils are equal, round, and reactive to light.   Cardiovascular:      Rate and Rhythm: Normal rate and regular rhythm.      Pulses: Normal pulses.   Pulmonary:      Effort: Pulmonary effort is normal.      Breath sounds: Normal breath sounds.   Abdominal:      General: Bowel sounds are normal.      Palpations: Abdomen is soft.      Tenderness: There is no abdominal tenderness. There is no right CVA tenderness or left CVA tenderness.   Musculoskeletal:         General: No tenderness. Normal range of motion.      Cervical back: Normal range of motion and neck supple. No tenderness.   Skin:     General: Skin is warm and dry.   Neurological:      General: No focal deficit present.      Mental Status: She is alert and oriented to person, place, and time.   Psychiatric:         Mood and Affect: Mood normal.         Behavior: Behavior normal.       Testing   Component      Latest Ref [facility] 2/27/2024   WBC      4.4 - 11.3 x10*3/uL 8.9    nRBC      0.0 - 0.0 /100 WBCs 0.0    RBC      4.00 - 5.20 x10*6/uL 4.72    HEMOGLOBIN      12.0 - 16.0 g/dL 13.5    HEMATOCRIT      36.0 - 46.0 % 41.1    MCV      80 - 100 fL 87    MCH      26.0 - 34.0 pg 28.6    MCHC      32.0 - 36.0 g/dL 32.8    RED CELL DISTRIBUTION WIDTH      11.5 - 14.5 % 12.7    Platelets      150 - 450 x10*3/uL 390    Neutrophils %      40.0 - 80.0 % 58.8    Immature Granulocytes %, Automated      0.0 - 0.9 % 0.6    Lymphocytes %      13.0 - 44.0 % 26.5    Monocytes %      2.0 - 10.0 % 7.7    Eosinophils %      0.0 - 6.0 % 5.5    Basophils %      0.0 - 2.0 % 0.9    Neutrophils Absolute      1.20 - 7.70 x10*3/uL 5.25    Immature Granulocytes Absolute, Automated      0.00 -  0.70 x10*3/uL 0.05    Lymphocytes Absolute      1.20 - 4.80 x10*3/uL 2.37    Monocytes Absolute      0.10 - 1.00 x10*3/uL 0.69    Eosinophils Absolute      0.00 - 0.70 x10*3/uL 0.49    Basophils Absolute      0.00 - 0.10 x10*3/uL 0.08    GLUCOSE      74 - 99 mg/dL 87    SODIUM      136 - 145 mmol/L 138    POTASSIUM      3.5 - 5.3 mmol/L 4.2    CHLORIDE      98 - 107 mmol/L 103    Bicarbonate      21 - 32 mmol/L 26    Anion Gap      10 - 20 mmol/L 13    Blood Urea Nitrogen      6 - 23 mg/dL 18    Creatinine      0.50 - 1.05 mg/dL 0.71    EGFR      >60 mL/min/1.73m*2 >90    Calcium      8.6 - 10.3 mg/dL 8.9    Albumin      3.4 - 5.0 g/dL 4.1    Alkaline Phosphatase      33 - 110 U/L 62    Total Protein      6.4 - 8.2 g/dL 6.9    AST      9 - 39 U/L 20    Bilirubin Total      0.0 - 1.2 mg/dL 0.3    ALT      7 - 45 U/L 18    CHOLESTEROL      0 - 199 mg/dL 193    HDL CHOLESTEROL      mg/dL 60.0    Cholesterol/HDL Ratio 3.2    LDL Calculated      <=99 mg/dL 115 (H)    VLDL      0 - 40 mg/dL 18    TRIGLYCERIDES      0 - 149 mg/dL 91    Non HDL Cholesterol      0 - 149 mg/dL 133    Vitamin B12      211 - 911 pg/mL 1,235 (H)    Vitamin D, 25-Hydroxy, Total      30 - 100 ng/mL 36    Thyroid Stimulating Hormone      0.44 - 3.98 mIU/L 4.64 (H)    Thyroxine, Free      0.61 - 1.12 ng/dL 0.56 (L)    MAGNESIUM      1.60 - 2.40 mg/dL 2.03         Impression    MDM    1) COMPLEXITY: MORE THAN 1 STABLE CHRONIC CONDITION ADDRESSED  2)DATA: TESTS INTERPRETED AND OR ORDERED, TOOK INDEPENDENT HISTORY OR RECORDS REVIEWED  3)RISK: MODERATE RISK DUE TO NATURE OF MEDICAL CONDITIONS/COMORBIDITY OR MEDICATIONS ORDERED OR SURGICAL OR PROCEDURE REFERRAL, .     Reviewed labs and Testing on file   Patient to follow diet low in cholesterol, fat, and sodium.    Patient is advised to increase Exercise.  Patient is recommended to lose weight.  Reviewed Meds and discussed common side effects  Continue as directed   Thyroid - improved but still off and  given symptoms would like to do a trial start of meds   B12 - hold meds x few mo then start every other day   Mood - cont on meds   Patient is strongly advised to be compliant with recommendations.    Return to Clinic sooner if needed.  Patient denies further questions/concerns at this time     Assessment/Plan   Problem List Items Addressed This Visit             ICD-10-CM    Situational mixed anxiety and depressive disorder F43.23    Low vitamin B12 level R79.89    Relevant Orders    Vitamin B12    Vitamin D deficiency E55.9    Relevant Orders    Vitamin D 25-Hydroxy,Total (for eval of Vitamin D levels)    Class 2 obesity due to excess calories without serious comorbidity with body mass index (BMI) of 37.0 to 37.9 in adult E66.09, Z68.37    Acquired hypothyroidism - Primary E03.9    Relevant Medications    levothyroxine (Synthroid, Levoxyl) 25 mcg tablet    Other Relevant Orders    Thyroid Stimulating Hormone    Thyroxine, Free        FU in 3-4 mo with labs and med check

## 2024-03-14 ENCOUNTER — HOSPITAL ENCOUNTER (OUTPATIENT)
Dept: HOSPITAL 100 - LABSPEC | Age: 34
Discharge: HOME | End: 2024-03-14
Payer: COMMERCIAL

## 2024-03-14 DIAGNOSIS — Z12.4: Primary | ICD-10-CM

## 2024-03-14 PROCEDURE — G0145 SCR C/V CYTO,THINLAYER,RESCR: HCPCS

## 2024-03-14 PROCEDURE — 88175 CYTOPATH C/V AUTO FLUID REDO: CPT

## 2024-03-14 PROCEDURE — 87624 HPV HI-RISK TYP POOLED RSLT: CPT

## 2024-03-14 NOTE — XMS RPT_ITS
Comprehensive CCD (C-CDA v2.1)  
  
                           Created on: 2024  
  
  
CASSANDRA MONTERO  
External Reference #: [id number]  
: 1990  
Sex: Female  
  
Demographics  
  
  
                                        Address             [address]  
[address]  
[place]  [address]  
   
                                        Home Phone          [phone]  
   
                                        Work Phone          [phone]  
   
                                        Preferred Language  en  
   
                                        Marital Status        
   
                                        Buddhism Affiliation Unknown  
   
                                        Race                White  
   
                                        Ethnic Group        Not  or Lati  
no  
  
  
Author  
  
  
                                        Name                Unknown  
   
                                        Address             [address] [facility]  
[address]  
[place]  [address]  
   
                                        Phone               [phone]  
   
                                        Organization        [facility]  
  
  
Care Team Providers  
  
  
                                Care Team Member Name Role            Phone  
   
                                Kevan, Mariposa     Admitting       Unavailable  
   
                                Kevan, Mariposa     Attending       Unavailable  
   
                                [facility]    Unavailable  
   
                                Alcon Dugan Admitting       Unavailable  
   
                                Alcon Dugan Attending       Unavailable  
   
                                [facility]    Unavailable  
   
                                Kevan, Mariposa     Attending       Unavailable  
   
                                MICHELLE PEREZ B Primary Care    Unavailable  
   
                                Michelle Perez Unavailable     [phone]  
[phone]  
   
                                Unavailable     Unavailable     [phone]  
   
                                Michelle Perez PA-C Primary Care Provider   
[phone]  
   
                                Ana, Ms. Michelle Parker Referring       Unav  
ailable  
   
                                Ana, Ms. Michelle Parker Attending       Unav  
ailable  
   
                                Ana, Ms. Michelle Parker Primary [facility]    Unav  
ailable  
   
                                Mike, Ms. Lisa Clifford Attending       Ivone  
vailable  
   
                                Ana, Ms. Michelle Parker [facility]    Unav  
ailable  
   
                                Radha MONTESINOS, Arvin M Unavailable     [phone]  
   
                                MICHELLE PEREZ Attending       Unavailable  
   
                                [facility] PERE[facility]    Unavailable  
   
                                MICHELLE PEREZ Attending       Unavailable  
   
                                MICHELLE PEREZ Primary [facility]    Unavailable  
   
                                MICHELLE PEREZ Attending       Unavailable  
   
                                MICHELLE PEREZ B Primary Care    Unavailable  
   
                                MICHELLE PEREZ B Primary Care    Unavailable  
  
  
  
Allergies  
  
  
                                                    Allergy   
Classification                          Reported   
Allergen(s)               Allergy Type              Date of   
Onset                     Reaction(s)               Facility  
   
                                                      
(2 sources)                             Citalopram;   
Translations:   
[Citalopram   
Hydrobromide TABS] Drug Allergy    2021      Nausea          [facility]   
Internal   
Medicine  
Work Phone:   
[phone]  
[phone]  
  
  
  
Medications  
Current Medications  
  
  
  
                      Medication Drug Class(es) Dates      Sig (Normalized) Sig   
(Original)  
   
                                                    acyclovir 400 mg   
oral tablet  
(12 sources)                            Herpesvirus   
Nucleoside Analog   
DNA Polymerase   
Inhibitor, Herpes   
Simplex Virus   
Nucleoside Analog   
DNA Polymerase   
Inhibitor, Herpes   
Zoster Virus   
Nucleoside Analog   
DNA Polymerase   
Inhibitor                               Start:   
2020                                          acyclovir   
(Zovirax) 400 mg   
tablet Take 1   
tablet (400 mg)   
by mouth. 1 TAB   
TID PRN 0   
2020 Active  
   
                                                    busPIRone   
hydrochloride 10 mg   
oral tablet  
(1 source)                                          Start:   
2020  
End:   
2021                              take 1 tablet by   
mouth three times   
daily as needed   
for anxiety                             busPIRone HCl -   
10 MG Oral Tablet   
TAKE 1 TABLET 3   
times daily PRN   
anxiety Quantity:   
90 Refills: 2   
Ordered:   
2021   
Michelle Perez PA-C Start :   
2020 End :   
2021   
Complete  
   
                                                    citalopram 40 mg   
oral tablet  
(13 sources)                            Serotonin Reuptake   
Inhibitor                               Start:   
2024                              take 1 tablet by   
mouth once daily                        citalopram   
(CeleXA) 40 mg   
tablet   
Indications:   
Situational mixed   
anxiety and   
depressive   
disorder Take 1   
tablet (40 mg) by   
mouth once daily.   
30 tablet 11   
2024 Active  
  
  
  
                                          
   
                                          
   
                                          
   
                                          
   
                                          
   
                                          
   
                                          
   
                                          
  
  
  
Completed/Discontinued Medications  
  
  
  
                      Medication Drug Class(es) Dates      Sig (Normalized) Sig   
(Original)  
   
                                                    phentermine   
hydrochloride 37.5   
mg oral tablet  
(2 sources)                             Sympathomimetic Amine   
Anorectic                               Start:   
2022                              take 1 capsule by   
mouth once daily   
before breakfast                        Phentermine HCl   
- 37.5 MG Oral   
Tablet TAKE 1   
CAPSULE BY MOUTH   
EVERY DAY IN THE   
MORNING BEFORE   
BREAKFAST   
Quantity: 30   
Refills: 0   
Ordered:   
2022   
Arvin Garcia MD Start :   
2022   
Active  
  
  
  
Problems  
Active Problems  
  
  
                                                    Problem   
Classification  Problem         Date            Documented Date Episodic/Chronic  
   
                                                    Adjustment disorders  
(17 sources)                            Mixed anxiety and   
depressive disorder;   
Translations:   
[Adjustment disorder   
with mixed anxiety   
and depressed mood]                     Onset:   
2023                Chronic  
   
                                                    Contraceptive and   
procreative   
management  
(9 sources)                             Patient encounter   
status;   
Translations:   
[Unspecified   
contraceptive   
management]                                                 Episodic  
   
                                                    Disorders of lipid   
metabolism  
(6 sources)                             Hypercholesterolemia  
; Translations:   
[Pure   
hypercholesterolemia  
, unspecified]                          Onset:   
2024  
Resolved:   
2024                Chronic  
   
                                                    Headache; including   
migraine  
(12 sources)                            Migraine;   
Translations:   
[Migraine,   
unspecified, without   
mention of   
intractable migraine   
without mention of   
status migrainosus]                     Onset:   
2023                Chronic  
   
                                                    Nutritional   
deficiencies  
(10 sources)                            Vitamin D   
deficiency;   
Translations:   
[Vitamin D   
deficiency,   
unspecified]                            Onset:   
2023                Chronic  
   
                                                    Other female genital   
disorders  
(9 sources)                             History of abnormal   
cervical   
Papanicolaou smear ;   
Translations:   
[Personal history of   
other genital system   
and obstetric   
disorders]                                                  Episodic  
  
  
  
                                          
   
                                          
   
                                          
   
                                          
   
                                          
   
                                          
   
                                          
  
  
Past or Other Problems  
  
  
                      Problem Classification Problem    Date       Documented Da  
te Episodic/Chronic  
   
                                                    Malaise and fatigue  
(20 sources)                            Malaise and fatigue;   
Translations: [Other   
malaise and fatigue]                    Onset:   
2023                Episodic  
   
                                                    Nutritional   
deficiencies  
(8 sources)                             Decreased vitamin   
B12 level;   
Translations:   
[Deficiency of other   
specified B group   
vitamins]                               Onset:   
2023                Episodic  
   
                                                    Other bone disease and   
musculoskeletal   
deformities  
(12 sources)                            Costal chondritis;   
Translations:   
[Tietze's disease]                      Onset:   
2023  
Resolved:   
2023                Episodic  
   
                                                    Other nutritional;   
endocrine; and   
metabolic disorders  
(12 sources)                            Weight gain;   
Translations:   
[Abnormal weight   
gain]                                   Onset:   
2023                Episodic  
   
                                                    Residual codes;   
unclassified  
(9 sources)                             Past history of   
procedure;   
Translations: [Other   
postprocedural   
status]                                 Onset:   
2021                                          Episodic  
  
  
  
                                          
   
                                          
   
                                          
  
  
  
Results  
  
  
                      Test Name  Value      Interpretation Reference Range Facil  
ity  
  
  
  
                                          
   
                                          
   
                                          
   
                                          
   
                                          
   
                                          
   
                                          
   
                                          
   
                                          
   
                                          
   
                                          
   
                                          
   
                                          
   
                                          
   
                                          
   
                                          
   
                                          
   
                                          
   
                                          
   
                                          
   
                                          
   
                                          
   
                                          
   
                                          
   
                                          
   
                                          
   
                                          
   
                                          
   
                                          
   
                                          
   
                                          
   
                                          
   
                                          
   
                                          
   
                                          
   
                                          
   
                                          
   
                                          
   
                                          
   
                                          
   
                                          
   
                                          
   
                                          
   
                                          
   
                                          
   
                                          
   
                                          
   
                                          
   
                                          
   
                                          
   
                                          
   
                                          
   
                                          
   
                                          
   
                                          
   
                                          
   
                                          
   
                                          
   
                                          
   
                                          
   
                                          
   
                                          
   
                                          
   
                                          
   
                                          
   
                                          
   
                                          
   
                                          
   
                                          
   
                                          
   
                                          
   
                                          
   
                                          
   
                                          
   
                                          
   
                                          
   
                                          
   
                                          
   
                                          
   
                                          
   
                                          
   
                                          
   
                                          
   
                                          
   
                                          
   
                                          
   
                                          
   
                                          
   
                                          
   
                                          
   
                                          
   
                                          
   
                                          
   
                                          
   
                                          
   
                                          
   
                                          
   
                                          
   
                                          
   
                                          
   
                                          
   
                                          
   
                                          
   
                                          
   
                                          
   
                                          
   
                                          
   
                                          
   
                                          
   
                                          
   
                                          
   
                                          
   
                                          
   
                                          
   
                                          
   
                                          
   
                                          
   
                                          
   
                                          
   
                                          
   
                                          
   
                                          
   
                                          
   
                                          
   
                                          
   
                                          
   
                                          
   
                                          
   
                                          
   
                                          
   
                                          
   
                                          
   
                                          
   
                                          
   
                                          
   
                                          
   
                                          
   
                                          
   
                                          
   
                                          
   
                                          
   
                                          
   
                                          
   
                                          
   
                                          
   
                                          
   
                                          
   
                                          
   
                                          
   
                                          
   
                                          
   
                                          
   
                                          
   
                                          
   
                                          
   
                                          
   
                                          
   
                                          
   
                                          
   
                                          
   
                                          
   
                                          
   
                                          
   
                                          
   
                                          
   
                                          
   
                                          
   
                                          
   
                                          
   
                                          
   
                                          
   
                                          
   
                                          
   
                                          
   
                                          
   
                                          
   
                                          
   
                                          
   
                                          
   
                                          
   
                                          
   
                                          
   
                                          
   
                                          
   
                                          
   
                                          
   
                                          
   
                                          
   
                                          
   
                                          
   
                                          
   
                                          
   
                                          
   
                                          
   
                                          
   
                                          
   
                                          
   
                                          
   
                                          
   
                                          
   
                                          
   
                                          
   
                                          
   
                                          
   
                                          
   
                                          
   
                                          
   
                                          
   
                                          
   
                                          
   
                                          
   
                                          
   
                                          
   
                                          
   
                                          
   
                                          
   
                                          
   
                                          
   
                                          
   
                                          
   
                                          
   
                                          
   
                                          
   
                                          
   
                                          
   
                                          
   
                                          
   
                                          
   
                                          
   
                                          
   
                                          
   
                                          
   
                                          
   
                                          
   
                                          
   
                                          
   
                                          
   
                                          
   
                                          
   
                                          
   
                                          
   
                                          
   
                                          
   
                                          
   
                                          
   
                                          
   
                                          
   
                                          
   
                                          
   
                                          
   
                                          
   
                                          
   
                                          
   
                                          
   
                                          
   
                                          
   
                                          
   
                                          
   
                                          
   
                                          
   
                                          
   
                                          
   
                                          
   
                                          
   
                                          
   
                                          
   
                                          
   
                                          
   
                                          
   
                                          
   
                                          
   
                                          
   
                                          
   
                                          
   
                                          
   
                                          
   
                                          
   
                                          
   
                                          
   
                                          
   
                                          
   
                                          
   
                                          
   
                                          
   
                                          
   
                                          
   
                                          
   
                                          
   
                                          
   
                                          
   
                                          
   
                                          
   
                                          
   
                                          
   
                                          
   
                                          
   
                                          
   
                                          
   
                                          
   
                                          
   
                                          
   
                                          
   
                                          
   
                                          
   
                                          
   
                                          
   
                                          
   
                                          
   
                                          
   
                                          
   
                                          
   
                                          
   
                                          
   
                                          
   
                                          
   
                                          
   
                                          
   
                                          
   
                                          
   
                                          
   
                                          
   
                                          
   
                                          
   
                                          
   
                                          
   
                                          
   
                                          
   
                                          
   
                                          
   
                                          
   
                                          
   
                                          
   
                                          
   
                                          
   
                                          
   
                                          
   
                                          
   
                                          
   
                                          
   
                                          
   
                                          
   
                                          
   
                                          
   
                                          
   
                                          
   
                                          
   
                                          
   
                                          
   
                                          
   
                                          
   
                                          
   
                                          
   
                                          
   
                                          
   
                                          
   
                                          
   
                                          
   
                                          
   
                                          
   
                                          
   
                                          
   
                                          
   
                                          
   
                                          
   
                                          
   
                                          
   
                                          
   
                                          
   
                                          
   
                                          
   
                                          
   
                                          
   
                                          
   
                                          
   
                                          
   
                                          
   
                                          
   
                                          
   
                                          
   
                                          
   
                                          
   
                                          
   
                                          
   
                                          
   
                                          
   
                                          
   
                                          
   
                                          
   
                                          
   
                                          
   
                                          
   
                                          
   
                                          
   
                                          
   
                                          
   
                                          
   
                                          
   
                                          
   
                                          
   
                                          
   
                                          
   
                                          
   
                                          
   
                                          
   
                                          
   
                                          
   
                                          
   
                                          
   
                                          
   
                                          
   
                                          
   
                                          
   
                                          
   
                                          
   
                                          
   
                                          
   
                                          
   
                                          
   
                                          
   
                                          
   
                                          
   
                                          
   
                                          
   
                                          
   
                                          
   
                                          
   
                                          
   
                                          
   
                                          
   
                                          
   
                                          
   
                                          
   
                                          
   
                                          
   
                                          
   
                                          
   
                                          
   
                                          
   
                                          
   
                                          
   
                                          
   
                                          
   
                                          
   
                                          
   
                                          
   
                                          
   
                                          
   
                                          
   
                                          
   
                                          
   
                                          
   
                                          
   
                                          
   
                                          
   
                                          
   
                                          
   
                                          
   
                                          
   
                                          
   
                                          
   
                                          
   
                                          
   
                                          
   
                                          
   
                                          
   
                                          
   
                                          
   
                                          
   
                                          
   
                                          
   
                                          
   
                                          
   
                                          
   
                                          
   
                                          
   
                                          
   
                                          
   
                                          
   
                                          
   
                                          
   
                                          
   
                                          
   
                                          
   
                                          
   
                                          
   
                                          
   
                                          
   
                                          
   
                                          
   
                                          
   
                                          
   
                                          
   
                                          
   
                                          
   
                                          
   
                                          
   
                                          
   
                                          
   
                                          
   
                                          
  
  
  
Vital Signs  
  
  
                      Date Time  Vital Sign Value      Performing [facility] cordero  
   
                                                    2024   
08:          Body height         157.5 cm            Michelle Perez PA-C  
Work Phone:   
[phone]                          [facility]  
   
                                                    2024   
08:                              Body mass index   
(BMI) [Ratio]             37.13 kg/m2               Michelle Perez PA-C  
Work Phone:   
[phone]                          [facility]  
   
                                                    2024   
08:          Body weight         92.08 kg            Michelle Perez PA-C  
Work Phone:   
[phone]                          [facility]  
   
                                                    2024   
08:                              Diastolic blood   
pressure                  60 mm[Hg]                 Michelle Waco   
PA-C  
Work Phone:   
[phone]                          [facility]  
   
                                                    2024   
08:                              Systolic blood   
pressure                  120 mm[Hg]                Michelle Ana   
PA-C  
Work Phone:   
[phone]                          [facility]  
   
                                                    2024   
08:          Body height         157.5 cm            Michelle Ana   
PA-C  
Work Phone:   
[phone]                          [facility]  
   
                                                    2024   
08:                              Body mass index   
(BMI) [Ratio]             35.48 kg/m2               Michelle Ana   
PA-C  
Work Phone:   
[phone]                          [facility]  
   
                                                    2024   
08:          Body weight         88 kg               Michelle Ana   
PA-C  
Work Phone:   
[phone]                          [facility]  
   
                                                    2024   
08:                              Diastolic blood   
pressure                  68 mm[Hg]                 Michelle Waco   
PA-C  
Work Phone:   
[phone]                          [facility]  
   
                                                    2024   
08:          Heart rate          78 /min             Michelle Ana   
PA-C  
Work Phone:   
[phone]                          [facility]  
   
                                                    2024   
08:                              SaO2% (BldA) [Mass   
fraction]                 98 %                      Michelle Waco   
PA-C  
Work Phone:   
[phone]                          [facility]  
   
                                                    2024   
08:                              Systolic blood   
pressure                  103 mm[Hg]                Michelle Ana   
PA-C  
Work Phone:   
[phone]                          [facility]  
   
                                                    2023   
09:          Body height         157.5 cm            Michelle Waco   
PA-C  
Work Phone:   
[phone]                          [facility]  
   
                                                    2023   
09:                              Body mass index   
(BMI) [Ratio]             36.21 kg/m2               Michelle Waco   
PA-C  
Work Phone:   
[phone]                          [facility]  
   
                                                    2023   
09:          Body weight         89.81 kg            Michelle Waco   
PA-C  
Work Phone:   
[phone]                          [facility]  
   
                                                    2023   
09:                              Diastolic blood   
pressure                  74 mm[Hg]                 Michelle Crespoall   
PA-C  
Work Phone:   
[phone]                          [facility]  
   
                                                    2023   
09:          Heart rate          74 /min             Michelle Crespoall   
PA-C  
Work Phone:   
[phone]                          [facility]  
   
                                                    2023   
09:                              Systolic blood   
pressure                  108 mm[Hg]                Michelle Crespoall   
PA-C  
Work Phone:   
[phone]                          [facility]  
   
                                                    08-   
08:          Body height         157.48 cm           Michelle MUKESH Ana  
Work Phone:   
[phone]                          [facility]   
Medicine  
Work Phone:   
[phone]  
   
                                                    08-   
08:                              Body mass index   
(BMI) [Ratio]             31.46 kg/m2               Michelle MUKESH Ana  
Work Phone:   
[phone]                          [facility]   
Medicine  
Work Phone:   
[phone]  
   
                                                    08-   
08:                              Body surface area   
Derived from   
formula                   1.79 m2                   Michelle MUKESH Waco  
Work Phone:   
[phone]                          [facility]   
Medicine  
Work Phone:   
[phone]  
   
                                                    08-   
08:          Body weight         78.02 kg            Michelle Perez  
Work Phone:   
[phone]                          [facility]   
Medicine  
Work Phone:   
[phone]  
   
                                                    08-   
08:                              Diastolic blood   
pressure                  74 mm[Hg]                 Michelle Guevaraenhall  
Work Phone:   
[phone]                          [facility]   
Medicine  
Work Phone:   
[phone]  
   
                                                    08-   
08:          Heart rate          81 /min             Michelle MUKESH CrespoWaco  
Work Phone:   
[phone]                          [facility]   
Medicine  
Work Phone:   
[phone]  
   
                                                    08-   
08:                              Systolic blood   
pressure                  108 mm[Hg]                Michelle B Ana  
Work Phone:   
[phone]                          [facility]   
Medicine  
Work Phone:   
[phone]  
   
                                                    2022   
16:          Body height         157.48 cm           Michelle Perez  
Work Phone:   
[phone]                          [facility]   
Medicine  
Work Phone:   
[phone]  
   
                                                    2022   
16:                              Body mass index   
(BMI) [Ratio]             31.64 kg/m2               Michelle Perez  
Work Phone:   
[phone]                          [facility]   
Medicine  
Work Phone:   
[phone]  
   
                                                    2022   
16:                              Body surface area   
Derived from   
formula                   1.8 m2                    Michelle Perez  
Work Phone:   
[phone]                          [facility]   
Medicine  
Work Phone:   
[phone]  
   
                                                    2022   
16:          Body weight         78.47 kg            Michelle Perez  
Work Phone:   
[phone]                          [facility]   
Medicine  
Work Phone:   
[phone]  
   
                                                    2022   
16:                              Diastolic blood   
pressure                  68 mm[Hg]                 Michelle Perez  
Work Phone:   
[phone]                          [facility]   
Medicine  
Work Phone:   
[phone]  
   
                                                    2022   
16:          Heart rate          70 /min             Michelle Perez  
Work Phone:   
[phone]                          [facility]   
Medicine  
Work Phone:   
[phone]  
   
                                                    2022   
16:                              Systolic blood   
pressure                  112 mm[Hg]                Michelle Peerz  
Work Phone:   
[phone]                          [facility]   
Medicine  
Work Phone:   
[phone]  
   
                                                    2022   
09:          Body height         157.48 cm           Michelle Perez  
Work Phone:   
[phone]                          [facility]   
Medicine  
Work Phone:   
[phone]  
   
                                                    2022   
09:                              Body mass index   
(BMI) [Ratio]             31.09 kg/m2               Michelle Perez  
Work Phone:   
[phone]                          [facility]   
Medicine  
Work Phone:   
[phone]  
   
                                                    2022   
09:                              Body surface area   
Derived from   
formula                   1.78 m2                   Michelle Perez  
Work Phone:   
[phone]                          [facility]   
Medicine  
Work Phone:   
[phone]  
   
                                                    2022   
09:          Body weight         77.11 kg            Michelle Perez  
Work Phone:   
[phone]                          [facility]   
Medicine  
Work Phone:   
[phone]  
   
                                                    2022   
09:                              Diastolic blood   
pressure                  82 mm[Hg]                 Michelle Perez  
Work Phone:   
[phone]                          [facility]   
Medicine  
Work Phone:   
[phone]  
   
                                                    2022   
09:          Heart rate          72 /min             Michelle Perez  
Work Phone:   
[phone]                          [facility]   
Medicine  
Work Phone:   
[phone]  
   
                                                    2022   
09:                              Systolic blood   
pressure                  116 mm[Hg]                Michelle Perez  
Work Phone:   
[phone]                          [facility]   
Medicine  
Work Phone:   
[phone]  
   
                                                    10-   
08:          Body height         157.48 cm           Michelle Perez  
Work Phone:   
[phone]                          [facility]   
Medicine  
Work Phone:   
[phone]  
   
                                                    10-   
08:                              Body mass index   
(BMI) [Ratio]             29.45 kg/m2               Michelle Perez  
Work Phone:   
[phone]                          [facility]   
Medicine  
Work Phone:   
[phone]  
   
                                                    10-   
08:                              Body surface area   
Derived from   
formula                   1.74 m2                   Michelle Perez  
Work Phone:   
[phone]                          [facility]   
Medicine  
Work Phone:   
[phone]  
   
                                                    10-   
08:          Body weight         73.03 kg            Michelle Perez  
Work Phone:   
[phone]                          [facility]   
Medicine  
Work Phone:   
[phone]  
   
                                                    10-   
08:                              Diastolic blood   
pressure                  68 mm[Hg]                 Michelle Perez  
Work Phone:   
[phone]                          [facility]   
Medicine  
Work Phone:   
[phone]  
   
                                                    10-   
08:          Heart rate          72 /min             Michelle Perez  
Work Phone:   
[phone]                          [facility]   
Medicine  
Work Phone:   
[phone]  
   
                                                    10-   
08:                              Systolic blood   
pressure                  100 mm[Hg]                Michelle Perez  
Work Phone:   
[phone]                          [facility]   
Medicine  
Work Phone:   
[phone]  
   
                                                    2021   
08:          Body height         157.48 cm           Michelle Perez  
Work Phone:   
[phone]                          [facility]   
Medicine  
Work Phone:   
[phone]  
   
                                                    2021   
08:                              Body mass index   
(BMI) [Ratio]             28.9 kg/m2                Michelle Perez  
Work Phone:   
[phone]                          [facility]   
Medicine  
Work Phone:   
[phone]  
   
                                                    2021   
08:                              Body surface area   
Derived from   
formula                   1.73 m2                   Michelle Perez  
Work Phone:   
[phone]                          [facility]   
Medicine  
Work Phone:   
[phone]  
   
                                                    2021   
08:          Body weight         71.67 kg            Michelle Perez  
Work Phone:   
[phone]                          [facility]   
Medicine  
Work Phone:   
[phone]  
   
                                                    2021   
08:                              Diastolic blood   
pressure                  70 mm[Hg]                 Michelle Perez  
Work Phone:   
[phone]                          [facility]   
Medicine  
Work Phone:   
[phone]  
   
                                                    2021   
08:          Heart rate          60 /min             Michelle Crespoall  
Work Phone:   
[phone]                          [facility]   
Medicine  
Work Phone:   
[phone]  
   
                                                    2021   
08:                              SaO2% (BldA) [Mass   
fraction]                 96 %                      Michelle Perez  
Work Phone:   
[phone]                          [facility]   
Medicine  
Work Phone:   
[phone]  
   
                                                    2021   
08:                              Systolic blood   
pressure                  110 mm[Hg]                Michelle Perez  
Work Phone:   
[phone]                          [facility]   
Medicine  
Work Phone:   
[phone]  
  
  
  
Encounters  
  
  
                          Encounter Date Encounter Type Care Provider Facility  
   
                                                    Start: 2024  
End: 2024     ambulatory          MICHELLE MAYORGA [facility]  
   
Ambulatory  
   
                                                    Start: 2024  
End: 2024                         Office outpatient visit   
25 minutes                              Michelle MAYORGA Ana HONEYCUTTC  
Work Phone:   
[phone]                          [facility]   
Medicine  
  
  
  
                                          
   
                                          
   
                                          
   
                                          
   
                                          
   
                                          
   
                                          
   
                                          
   
                                          
   
                                          
   
                                          
   
                                          
   
                                          
   
                                          
   
                                          
   
                                          
   
                                          
   
                                          
   
                                          
   
                                          
   
                                          
   
                                          
   
                                          
   
                                          
   
                                          
   
                                          
   
                                          
   
                                          
  
  
  
Procedures  
  
  
                          Date         Procedure    Procedure Detail Performing   
Clinician  
   
                                        Start: 2024   CBC W Auto Different  
ial   
panel - Blood                                       MICHELLEMELISSA GUEVARAANA  
   
                                        Start: 2024   Comprehensive metabo  
lic   
2000 panel - Serum or   
Plasma                                              MICHELLE GUEVARAENHALL  
   
                          Start: 2024 Cyanocobalamin vitamin b-12           
     MICHELLE PEREZ  
   
                          Start: 2024 Lipid panel               MICHELLE MANDUJANO  
[place]  
   
                                        Start: 2024   Magnesium [Mass/volu  
me] in   
Serum or Plasma                                     MICHELLE GUEVARAENHALL  
   
                          Start: 2024 THYROXINE, FREE              MICHELLE CRESPOALL  
   
                          Start: 2024 VITAMIN D 25-HYDROXY,TOTAL            
    MICHELLE GUEVARAENHALL  
   
                                        Start: 2024   Lipid 1996 panel - S  
hali or   
Plasma                                              Michelle Perez PA-C  
Work Phone:   
[phone]  
   
                                        Start: 2024   Thyrotropin [Units/v  
olume]   
in Serum or Plasma                                  Michelle Perez PA-C  
Work Phone:   
[phone]  
   
                                        Start: 2023   Lipid 1996 panel - S  
hali or   
Plasma                                              Michelle Perez PA-C  
Work Phone:   
[phone]  
   
                                        Start: 10-   Lipid  panel - S  
hali or   
Plasma                                              Michelle Perez PA-C  
Work Phone:   
[phone]  
   
                                        Start: 2021   Microscopic observat  
ion   
[Identifier] in Cervix by   
Cyto stain                                          Michelle Perez PA-C  
Work Phone:   
[phone]  
   
                                       Colposcopy                Michelle wood  
Work Phone:   
[phone]  
  
  
  
                                          
  
  
  
Plan of Treatment  
  
  
                          Date         Care Activity Detail       Author  
   
                                Start: 10- Zoster Vaccines (1 of 2) Zoste  
r Vaccines (1 of   
2)                                      [facility]  
   
                          Start: 2029 Lipid panel  Lipid Panel  [facility]  
   
                          Start: 2028 Lipid panel  Lipid Panel  [facility]  
   
                          Start: 10- Lipid panel  Lipid Panel  [facility]  
   
                                        Start: 2025   Thyroid stimulating   
hormone measurement       TSH Level                 [facility]  
   
                          Start: 2024 Yearly Adult Physical Yearly Adult P  
hysical [facility]  
   
                                                    Start: 2024  
End: 2024                         Patient encounter   
procedure                               2024 8:00 AM EDT   
Office Visit [facility]   
 [address]   
[place] [id number] [phone]   
Michelle Perez PA-C  [place] [address] [place]   
[address] [phone]   
(Work) [phone]   
(Fax)                                   [facility]   
Medicine  
   
                                                    Start: 2024  
End: 2025                         25-hydroxyvitamin D3   
[Mass/volume] in Serum   
or Plasma                               Vitamin D   
25-Hydroxy,Total (for   
eval of Vitamin D   
levels) Lab Routine   
Vitamin D deficiency   
Expected: 2024   
(Approximate),   
Expires: 2025                     [facility]  
Work Phone:   
[phone]  
  
  
  
                                          
   
                                          
   
                                          
   
                                          
   
                                          
   
                                          
   
                                          
   
                                          
   
                                          
   
                                          
   
                                          
   
                                          
   
                                          
   
                                          
   
                                          
   
                                          
   
                                          
   
                                          
   
                                          
   
                                          
   
                                          
   
                                          
   
                                          
   
                                          
   
                                          
   
                                          
   
                                          
   
                                          
   
                                          
   
                                          
   
                                          
   
                                          
   
                                          
   
                                          
   
                                          
   
                                          
   
                                          
   
                                          
   
                                          
   
                                          
   
                                          
   
                                          
   
                                          
   
                                          
   
                                          
   
                                          
   
                                          
   
                                          
  
  
  
Immunizations  
  
  
                      Immunization Date Immunization Notes      Care Provider Fa  
cility  
   
                                        2021          Moderna COVID-19   
Vaccine 100 MCG/0.5ML   
Intramuscular   
Suspension                                          Michelle Perez  
Work Phone:   
[phone]                          [facility]  
   
                                        2021          Moderna COVID-19   
Vaccine 100 MCG/0.5ML   
Intramuscular   
Suspension                                          Michelle Perez  
Work Phone:   
[phone]                          [facility]   
Internal Medicine  
Work Phone:   
[phone]  
  
  
  
                                          
   
                                          
  
  
  
Payers  
  
  
                          Date         Payer Category Payer        Policy ID  
   
                                2024      Private Health Insurance [employer]   
[id number]   
2024-Present [address] [place] [address]                           [id number]  
[id number]  
   
                          2024   Private Health Insurance              109  
[id number]  
   
                          2020   Unknown                   [id number]  
   
                          2019   Unknown                     
   
                          1990   Unknown                   [id number]   
[id number]  
[id number]  
   
                          1990   Unknown                   [id number]   
[id number]  
[id number]  
   
                          1990   Unknown                   [id number]   
[id number]  
[id number]  
   
                          1990   Unknown                   [id number]   
[id number]  
[id number]  
   
                          1990   Unknown                   [id number]   
[id number]  
[id number]  
   
                          1990   Unknown                   [id number]   
[id number]  
[id number]  
   
                          1990   Unknown                   [id number]   
[id number]  
[id number]  
   
                          1990   Unknown                   [id number]   
[id number]  
[id number]  
   
                          1990   Unknown                   [id number]   
[id number]  
[id number]  
  
  
  
Social History  
  
  
                          Date         Type         Detail       Facility  
   
                                                    Start: 2023  
End: 2024     Non-smoker          Non-smoker          [facility]  
   
                                        Start: 2023   Tobacco smoking stat  
[facility]                      Never smoked tobacco      [facility]  
Work Phone:   
[phone]  
   
                                        Start: 2023   Tobacco use and   
exposure                                Smokeless tobacco   
non-user                                [facility]  
Work Phone:   
[phone]  
   
                                                    Start: 2023  
End: 2024           Alcohol intake            Lifetime non-drinker   
(finding)                               [facility]  
Work Phone:   
[phone]  
   
                                                    Start: 2023  
End: 2024     Tobacco use panel                       [facility]  
   
                          Start: 1990 Sex Assigned At Birth Not on file  U  
[facility]  
Work Phone:   
[phone]  
   
                                                    Start: 2023  
End: 2024                         Exposure to SARS-CoV-2   
(event)                   Not sure                  [facility]  
  
  
  
History of Present illness Narrative 2024  
               Michelle Perez PA-C - 2024 8:20 AM EST  
  
                                Note Date & Type Note            Facility  
   
                                                    2024 History of   
Present illness Narrative               Formatting of this note is   
different from the original.  
Subjective  
Patient ID: Cassandra Montero is a 33 y.o. female   
who presents for Follow-up (1   
MONTH FOLLOW UP)  
  
HPI  
  
-Labs -  
  
- med check  
herpes -stable on acyclovir   
prn. - denies needing new   
script  
  
mood  
celexa - doing well -  
  
Vit D - 1/week  
B12 - stop x 1-2 mo and then   
consider starting few   
times/week  
  
-Preventative Testing y  
PAP - Following with GYN last   
PAP -- Ayesha Poon -   
[place]  
Mammo- fam hx of breast Cancer   
but in older age - suggest age   
40  
DEXA - suggest 55  
Colonoscopy - Suggest age 45-50  
Fall - No 2024  
PHQ2 - No 2024  
  
-Other Providers  
GYN - Ayesha Poon  
EYE- visit set in Aug - follows   
annually  
Dentist - every 6 months  
  
Patient Active Problem List  
Diagnosis  
Borderline high cholesterol  
Herpes simplex  
Malaise and fatigue  
Migraine  
Situational mixed anxiety and   
depressive disorder  
Weight gain  
Low vitamin B12 level  
Vitamin D deficiency  
Class 2 obesity due to excess   
calories without serious   
comorbidity with body mass   
index (BMI) of 35.0 to 35.9 in   
adult  
  
Review of Systems  
Constitutional: Positive for   
fatigue. Negative for chills   
and fever.  
HENT: Negative for congestion,   
rhinorrhea, sinus pain, sore   
throat and tinnitus.  
Eyes: Negative for discharge,   
redness and visual disturbance.  
Respiratory: Negative for   
cough, chest tightness,   
shortness of breath and   
wheezing.  
Cardiovascular: Negative for   
chest pain, palpitations and   
leg swelling.  
Gastrointestinal: Negative for   
abdominal pain, constipation,   
diarrhea, nausea and vomiting.  
Endocrine: Negative for cold   
intolerance and heat   
intolerance.  
Genitourinary: Negative for   
flank pain, frequency and   
urgency.  
Musculoskeletal: Negative for   
back pain, gait problem and   
neck pain.  
Skin: Negative for rash and   
wound.  
Neurological: Negative for   
dizziness, tremors, syncope,   
numbness and headaches.  
Hematological: Does not   
bruise/bleed easily.  
Psychiatric/Behavioral:   
Positive for dysphoric mood.   
Negative for confusion, sleep   
disturbance and suicidal ideas.   
The patient is nervous/anxious.  
  
Past Medical History:  
Diagnosis Date  
Encounter for gynecological   
examination (general) (routine)   
without abnormal findings  
Pap test, as part of routine   
gynecological examination  
High thyroid stimulating   
hormone (TSH) level 2024  
Hypercholesterolemia 2024  
Other conditions influencing   
health status  
Menarche  
Other specified postprocedural   
states  
History of Papanicolaou smear  
Personal history of other   
diseases of the female genital   
tract  
History of abnormal cervical   
Papanicolaou smear  
  
Past Surgical History:  
Procedure Laterality Date  
COLPOSCOPY 2013  
  
Family History  
Problem Relation Name Age of   
Onset  
Thyroid disease Mother  
No Known Problems Father  
Emphysema Other  
Breast cancer Other  
  
Social History  
  
Tobacco Use  
Smoking status: Never  
Smokeless tobacco: Never  
Vaping Use  
Vaping Use: Never used  
Substance Use Topics  
Alcohol use: Never  
Drug use: Never  
  
No Known Allergies  
  
Current Outpatient Medications  
Medication Sig Dispense Refill  
acyclovir (Zovirax) 400 mg   
tablet Take 1 tablet (400 mg)   
by mouth. 1 TAB TID PRN  
citalopram (CeleXA) 40 mg   
tablet Take 1 tablet (40 mg) by   
mouth once daily. 30 tablet 11  
cyanocobalamin (Vitamin B-12)   
1,000 mcg tablet Take 1 tablet   
(1,000 mcg) by mouth once   
daily. 30 tablet 11  
ergocalciferol (Vitamin D-2)   
1.25 MG (43801 UT) capsule Take   
1 capsule (50,000 Units) by   
mouth 1 (one) time per week. 5   
capsule 11  
  
No current   
facility-administered   
medications for this visit.  
  
Objective  
/60   Ht 1.575 m (5' 2 )   
  Wt 92.1 kg (203 lb)   BMI   
37.13 kg/m  
  
Physical Exam  
Vitals reviewed.  
Constitutional:  
Appearance: Normal appearance.   
She is obese.  
HENT:  
Head: Normocephalic.  
Right Ear: External ear normal.  
Left Ear: External ear normal.  
Nose: Nose normal. No   
congestion or rhinorrhea.  
Mouth/Throat:  
Mouth: Mucous membranes are   
moist.  
Eyes:  
Extraocular Movements:   
Extraocular movements intact.  
Conjunctiva/sclera:   
Conjunctivae normal.  
Pupils: Pupils are equal,   
round, and reactive to light.  
Cardiovascular:  
Rate and Rhythm: Normal rate   
and regular rhythm.  
Pulses: Normal pulses.  
Pulmonary:  
Effort: Pulmonary effort is   
normal.  
Breath sounds: Normal breath   
sounds.  
Abdominal:  
General: Bowel sounds are   
normal.  
Palpations: Abdomen is soft.  
Tenderness: There is no   
abdominal tenderness. There is   
no right CVA tenderness or left   
CVA tenderness.  
Musculoskeletal:  
General: No tenderness. Normal   
range of motion.  
Cervical back: Normal range of   
motion and neck supple. No   
tenderness.  
Skin:  
General: Skin is warm and dry.  
Neurological:  
General: No focal deficit   
present.  
Mental Status: She is alert and   
oriented to person, place, and   
time.  
Psychiatric:  
Mood and Affect: Mood normal.  
Behavior: Behavior normal.  
  
Testing  
Component  
Latest Ref [facility] 2024  
WBC  
4.4 - 11.3 x10*3/uL 8.9  
nRBC  
0.0 - 0.0 /100 WBCs 0.0  
RBC  
4.00 - 5.20 x10*6/uL 4.72  
HEMOGLOBIN  
12.0 - 16.0 g/dL 13.5  
HEMATOCRIT  
36.0 - 46.0 % 41.1  
MCV  
80 - 100 fL 87  
MCH  
26.0 - 34.0 pg 28.6  
MCHC  
32.0 - 36.0 g/dL 32.8  
RED CELL DISTRIBUTION WIDTH  
11.5 - 14.5 % 12.7  
Platelets  
150 - 450 x10*3/uL 390  
Neutrophils %  
40.0 - 80.0 % 58.8  
Immature Granulocytes %,   
Automated  
0.0 - 0.9 % 0.6  
Lymphocytes %  
13.0 - 44.0 % 26.5  
Monocytes %  
2.0 - 10.0 % 7.7  
Eosinophils %  
0.0 - 6.0 % 5.5  
Basophils %  
0.0 - 2.0 % 0.9  
Neutrophils Absolute  
1.20 - 7.70 x10*3/uL 5.25  
Immature Granulocytes Absolute,   
Automated  
0.00 - 0.70 x10*3/uL 0.05  
Lymphocytes Absolute  
1.20 - 4.80 x10*3/uL 2.37  
Monocytes Absolute  
0.10 - 1.00 x10*3/uL 0.69  
Eosinophils Absolute  
0.00 - 0.70 x10*3/uL 0.49  
Basophils Absolute  
0.00 - 0.10 x10*3/uL 0.08  
GLUCOSE  
74 - 99 mg/dL 87  
SODIUM  
136 - 145 mmol/L 138  
POTASSIUM  
3.5 - 5.3 mmol/L 4.2  
CHLORIDE  
98 - 107 mmol/L 103  
Bicarbonate  
21 - 32 mmol/L 26  
Anion Gap  
10 - 20 mmol/L 13  
Blood Urea Nitrogen  
6 - 23 mg/dL 18  
Creatinine  
0.50 - 1.05 mg/dL 0.71  
EGFR  
>60 mL/min/1.73m*2 >90  
Calcium  
8.6 - 10.3 mg/dL 8.9  
Albumin  
3.4 - 5.0 g/dL 4.1  
Alkaline Phosphatase  
33 - 110 U/L 62  
Total Protein  
6.4 - 8.2 g/dL 6.9  
AST  
9 - 39 U/L 20  
Bilirubin Total  
0.0 - 1.2 mg/dL 0.3  
ALT  
7 - 45 U/L 18  
CHOLESTEROL  
0 - 199 mg/dL 193  
HDL CHOLESTEROL  
mg/dL 60.0  
Cholesterol/HDL Ratio 3.2  
LDL Calculated  
<=99 mg/dL 115 (H)  
VLDL  
0 - 40 mg/dL 18  
TRIGLYCERIDES  
0 - 149 mg/dL 91  
Non HDL Cholesterol  
0 - 149 mg/dL 133  
Vitamin B12  
211 - 911 pg/mL 1,235 (H)  
Vitamin D, 25-Hydroxy, Total  
30 - 100 ng/mL 36  
Thyroid Stimulating Hormone  
0.44 - 3.98 mIU/L 4.64 (H)  
Thyroxine, Free  
0.61 - 1.12 ng/dL 0.56 (L)  
MAGNESIUM  
1.60 - 2.40 mg/dL 2.03  
  
  
Impression  
  
MDM  
  
1) COMPLEXITY: MORE THAN 1   
STABLE CHRONIC CONDITION   
ADDRESSED  
2)DATA: TESTS INTERPRETED AND   
OR ORDERED, TOOK INDEPENDENT   
HISTORY OR RECORDS REVIEWED  
3)RISK: MODERATE RISK DUE TO   
NATURE OF MEDICAL   
CONDITIONS/COMORBIDITY OR   
MEDICATIONS ORDERED OR SURGICAL   
OR PROCEDURE REFERRAL, .  
  
Reviewed labs and Testing on   
file  
Patient to follow diet low in   
cholesterol, fat, and sodium.  
Patient is advised to increase   
Exercise.  
Patient is recommended to lose   
weight.  
Reviewed Meds and discussed   
common side effects  
Continue as directed  
Thyroid - improved but still   
off and given symptoms would   
like to do a trial start of   
meds  
B12 - hold meds x few mo then   
start every other day  
Mood - cont on meds  
Patient is strongly advised to   
be compliant with   
recommendations.  
Return to Clinic sooner if   
needed.  
Patient denies further   
questions/concerns at this time  
  
Assessment/Plan  
Problem List Items Addressed   
This Visit  
  
ICD-10-CM  
Situational mixed anxiety and   
depressive disorder F43.23  
Low vitamin B12 level R79.89  
Relevant Orders  
Vitamin B12  
Vitamin D deficiency E55.9  
Relevant Orders  
Vitamin D 25-Hydroxy,Total (for   
eval of Vitamin D levels)  
Class 2 obesity due to excess   
calories without serious   
comorbidity with body mass   
index (BMI) of 37.0 to 37.9 in   
adult E66.09, Z68.37  
Acquired hypothyroidism -   
Primary E03.9  
Relevant Medications  
levothyroxine (Synthroid,   
Levoxyl) 25 mcg tablet  
Other Relevant Orders  
Thyroid Stimulating Hormone  
Thyroxine, Free  
  
FU in 3-4 mo with labs and med   
check  
Electronically signed by Michelle Perez PA-C at   
2024 9:51 PM EST  
documented in this encounter            [facility]  
Work Phone: [phone]  
  
  
  
History of Present illness Narrative 2024  
               Michelle Perez PA-C - 2024 8:00 AM EST  
  
                                Note Date & Type Note            Facility  
   
                                                    2024 History of   
Present illness Narrative               Formatting of this note is   
different from the original.  
Subjective  
Patient ID: Cassandra Montero is a 33 y.o. female   
who presents for Follow-up (6   
MONTH F/U WITH LABS. NO   
CONCERNS)  
  
HPI  
  
-Labs - not done - she did do   
labs the same day as her last   
visit and states not all labs   
were processed at that time  
- med check  
herpes -stable on acyclovir   
prn. - denies needing new   
script  
  
mood  
celexa - doing well - has been   
taking 20 mg and would like to   
inc  
  
Vit D  
B12 -  
  
-Preventative Testing y  
PAP - Following with GYN last   
PAP -- Ayesha Poon -   
[place]  
Mammo- fam hx of breast Cancer   
but in older age - suggest age   
40  
DEXA - suggest 55  
Colonoscopy - Suggest age 45-50  
Fall - No 2024  
PHQ2 - No 2024  
  
-Other Providers  
GYN - Ayesha Poon  
EYE- visit set in Aug - follows   
annually  
Dentist - every 6 months  
  
Patient Active Problem List  
Diagnosis  
Borderline high cholesterol  
Herpes simplex  
Malaise and fatigue  
Migraine  
Situational mixed anxiety and   
depressive disorder  
Weight gain  
Low vitamin B12 level  
Vitamin D deficiency  
Class 2 obesity due to excess   
calories without serious   
comorbidity with body mass   
index (BMI) of 36.0 to 36.9 in   
adult  
  
Review of Systems  
Constitutional: Positive for   
fatigue. Negative for chills   
and fever.  
HENT: Negative for congestion,   
rhinorrhea, sinus pain, sore   
throat and tinnitus.  
Eyes: Negative for discharge,   
redness and visual disturbance.  
Respiratory: Negative for   
cough, chest tightness,   
shortness of breath and   
wheezing.  
Cardiovascular: Negative for   
chest pain, palpitations and   
leg swelling.  
Gastrointestinal: Negative for   
abdominal pain, constipation,   
diarrhea, nausea and vomiting.  
Endocrine: Negative for cold   
intolerance and heat   
intolerance.  
Genitourinary: Negative for   
flank pain, frequency and   
urgency.  
Musculoskeletal: Negative for   
back pain, gait problem and   
neck pain.  
Skin: Negative for rash and   
wound.  
Neurological: Negative for   
dizziness, tremors, syncope,   
numbness and headaches.  
Hematological: Does not   
bruise/bleed easily.  
Psychiatric/Behavioral:   
Positive for dysphoric mood.   
Negative for confusion, sleep   
disturbance and suicidal ideas.   
The patient is nervous/anxious.  
  
Past Medical History:  
Diagnosis Date  
Encounter for gynecological   
examination (general) (routine)   
without abnormal findings  
Pap test, as part of routine   
gynecological examination  
Other conditions influencing   
health status  
Menarche  
Other specified postprocedural   
states  
History of Papanicolaou smear  
Personal history of other   
diseases of the female genital   
tract  
History of abnormal cervical   
Papanicolaou smear  
  
Past Surgical History:  
Procedure Laterality Date  
COLPOSCOPY 2013  
  
Family History  
Problem Relation Name Age of   
Onset  
Thyroid disease Mother  
No Known Problems Father  
Emphysema Other  
Breast cancer Other  
  
Social History  
  
Tobacco Use  
Smoking status: Never  
Smokeless tobacco: Never  
Vaping Use  
Vaping Use: Never used  
Substance Use Topics  
Alcohol use: Never  
Drug use: Never  
  
No Known Allergies  
  
Current Outpatient Medications  
Medication Sig Dispense Refill  
acyclovir (Zovirax) 400 mg   
tablet Take 1 tablet (400 mg)   
by mouth. 1 TAB TID PRN  
citalopram (CeleXA) 10 mg   
tablet Take 1 tablet (10 mg) by   
mouth once daily. 30 tablet 11  
cyanocobalamin (Vitamin B-12)   
1,000 mcg tablet Take 1 tablet   
(1,000 mcg) by mouth once   
daily. 30 tablet 11  
ergocalciferol (Vitamin D-2)   
1.25 MG (23771 UT) capsule Take   
1 capsule (50,000 Units) by   
mouth 1 (one) time per week. 5   
capsule 11  
  
No current   
facility-administered   
medications for this visit.  
  
Objective  
/68   Pulse 78   Ht 1.575   
m (5' 2 )   Wt 88 kg (194 lb)     
SpO2 98%   BMI 35.48 kg/m  
  
Physical Exam  
Vitals reviewed.  
Constitutional:  
Appearance: Normal appearance.   
She is obese.  
HENT:  
Head: Normocephalic.  
Right Ear: External ear normal.  
Left Ear: External ear normal.  
Nose: Nose normal. No   
congestion or rhinorrhea.  
Mouth/Throat:  
Mouth: Mucous membranes are   
moist.  
Eyes:  
Extraocular Movements:   
Extraocular movements intact.  
Conjunctiva/sclera:   
Conjunctivae normal.  
Pupils: Pupils are equal,   
round, and reactive to light.  
Cardiovascular:  
Rate and Rhythm: Normal rate   
and regular rhythm.  
Pulses: Normal pulses.  
Pulmonary:  
Effort: Pulmonary effort is   
normal.  
Breath sounds: Normal breath   
sounds.  
Abdominal:  
General: Bowel sounds are   
normal.  
Palpations: Abdomen is soft.  
Tenderness: There is no   
abdominal tenderness. There is   
no right CVA tenderness or left   
CVA tenderness.  
Musculoskeletal:  
General: No tenderness. Normal   
range of motion.  
Cervical back: Normal range of   
motion and neck supple. No   
tenderness.  
Skin:  
General: Skin is warm and dry.  
Neurological:  
General: No focal deficit   
present.  
Mental Status: She is alert and   
oriented to person, place, and   
time.  
Psychiatric:  
Mood and Affect: Mood normal.  
Behavior: Behavior normal.  
  
Testing  
Component  
Latest Ref [facility] 2023  
WBC  
4.4 - 11.3 x10E9/L 7.9  
RBC  
4.00 - 5.20 x10E12/L 4.97  
HEMOGLOBIN  
12.0 - 16.0 g/dL 14.2  
HEMATOCRIT  
36.0 - 46.0 % 44.2  
MCV  
80 - 100 fL 89  
MCHC  
32.0 - 36.0 g/dL 32.1  
Platelets  
150 - 450 x10E9/L 354  
RED CELL DISTRIBUTION WIDTH  
11.5 - 14.5 % 12.8  
Neutrophils %  
40.0 - 80.0 % 55.1  
Immature Granulocytes %,   
Automated  
0.0 - 0.9 % 0.8  
Lymphocytes %  
13.0 - 44.0 % 30.2  
Monocytes %  
2.0 - 10.0 % 8.8  
Eosinophils %  
0.0 - 6.0 % 4.2  
Basophils %  
0.0 - 2.0 % 0.9  
Neutrophils Absolute  
1.20 - 7.70 x10E9/L 4.35  
Lymphocytes Absolute  
1.20 - 4.80 x10E9/L 2.38  
Monocytes Absolute  
0.10 - 1.00 x10E9/L 0.69  
Eosinophils Absolute  
0.00 - 0.70 x10E9/L 0.33  
Basophils Absolute  
0.00 - 0.10 x10E9/L 0.07  
GLUCOSE  
74 - 99 mg/dL 86  
SODIUM  
136 - 145 mmol/L 136  
POTASSIUM  
3.5 - 5.3 mmol/L 4.5  
CHLORIDE  
98 - 107 mmol/L 104  
Bicarbonate  
21 - 32 mmol/L 26  
Anion Gap  
10 - 20 mmol/L 11  
Blood Urea Nitrogen  
6 - 23 mg/dL 21  
Creatinine  
0.50 - 1.05 mg/dL 0.70  
GFR Female  
>90 mL/min/1.73m2 >90  
Calcium  
8.6 - 10.3 mg/dL 9.5  
Albumin  
3.4 - 5.0 g/dL 4.5  
Alkaline Phosphatase  
33 - 110 U/L 57  
Total Protein  
6.4 - 8.2 g/dL 7.3  
AST  
9 - 39 U/L 20  
Bilirubin Total  
0.0 - 1.2 mg/dL 0.3  
ALT  
7 - 45 U/L 18  
CHOLESTEROL  
0 - 199 mg/dL 219 (H)  
HDL CHOLESTEROL  
mg/dL 67.0  
Cholesterol/HDL Ratio 3.3  
LDL Calculated  
0 - 99 mg/dL 132 (H)  
VLDL  
0 - 40 mg/dL 20  
TRIGLYCERIDES  
0 - 149 mg/dL 99  
IRON  
35 - 150 ug/dL 72  
TIBC  
240 - 445 ug/dL 423  
% Saturation  
25 - 45 % 17 (L)  
Hemoglobin A1C  
% 5.3  
Estimated Average Glucose  
MG/  
Vitamin D, 25-Hydroxy, Total  
ng/mL 31  
FOLATE  
>5.0 ng/mL 16.6  
Vitamin B12  
211 - 911 pg/mL 358  
FERRITIN  
8 - 150 ug/L 81  
Insulin  
3 - 25 uIU/mL 20  
Thyroxine  
4.5 - 11.1 ug/dL 6.0  
Triiodothyronine  
60 - 200 ng/dL 148  
Thyroid Stimulating Hormone  
0.44 - 3.98 mIU/L 6.23 (H)  
Thyroxine, Free  
0.61 - 1.12 ng/dL 0.60 (L)  
  
  
Impression  
  
MDM  
  
1) COMPLEXITY: MORE THAN 1   
STABLE CHRONIC CONDITION   
ADDRESSED  
2)DATA: TESTS INTERPRETED AND   
OR ORDERED, TOOK INDEPENDENT   
HISTORY OR RECORDS REVIEWED  
3)RISK: MODERATE RISK DUE TO   
NATURE OF MEDICAL   
CONDITIONS/COMORBIDITY OR   
MEDICATIONS ORDERED OR SURGICAL   
OR PROCEDURE REFERRAL, .  
  
  
Reviewed labs and Testing on   
file  
Patient to follow diet low in   
cholesterol, fat, and sodium.  
Patient is advised to increase   
Exercise.  
Patient is recommended to lose   
weight.  
Reviewed Meds and discussed   
common side effects  
Continue as directed  
Advised we get updated labs   
given thyroid labs and some of   
her symptoms  
Recheck vit levels since   
starting supplement  
Celexa - inc to 40  
Patient is strongly advised to   
be compliant with   
recommendations.  
Return to Clinic sooner if   
needed.  
Patient denies further   
questions/concerns at this time  
  
Assessment/Plan  
Problem List Items Addressed   
This Visit  
  
ICD-10-CM  
Situational mixed anxiety and   
depressive disorder F43.23  
Relevant Medications  
citalopram (CeleXA) 40 mg   
tablet  
Low vitamin B12 level R79.89  
Relevant Orders  
Vitamin B12  
Vitamin D deficiency - Primary   
E55.9  
Relevant Orders  
Vitamin D 25-Hydroxy,Total (for   
eval of Vitamin D levels)  
Class 2 obesity due to excess   
calories without serious   
comorbidity with body mass   
index (BMI) of 35.0 to 35.9 in   
adult E66.09, Z68.35  
  
Other Visit Diagnoses  
  
Codes  
Hypercholesteremia E78.00  
Relevant Orders  
CBC and Auto Differential  
Comprehensive Metabolic Panel  
Lipid Panel  
Thyroid Stimulating Hormone  
Thyroxine, Free  
Magnesium  
Vitamin B12  
Vitamin D 25-Hydroxy,Total (for   
eval of Vitamin D levels)  
Elevated TSH R79.89  
Relevant Orders  
Thyroid Stimulating Hormone  
Thyroxine, Free  
  
  
  
FU in 1-4 weeks with labs at   
[facility] fasting and med check  
Electronically signed by Michelle Perez PA-C at   
2024 9:22 AM EST  
documented in this encounter            [facility]  
Work Phone: [phone]  
  
  
  
History of Present illness Narrative 2023  
               Michelle Perez PA-C - 2023 9:20 AM EDT  
  
                                Note Date & Type Note            Facility  
   
                                                    2023 History of   
Present illness Narrative               Formatting of this note is   
different from the original.  
Subjective  
Patient ID: Cassandra Montero is a 32 y.o. female   
who presents for annual   
wellness (Rev labs )  
  
HPI  
Wellness  
-Labs  
- med check  
herpes -stable on acyclovir   
prn. - denies needing new   
script  
  
mood  
celexa - doing well  
  
-Preventative Testing y  
PAP - Following with GYN last   
PAP -- Ayesha Thompsons -   
[place]  
Mammo- fam hx of breast Cancer   
but in older age - suggest age   
40  
DEXA - suggest 55  
Colonoscopy - Suggest age 45-50  
Fall - No 2023  
PHQ2 - No 2023  
  
-Other Providers  
GYN - Ayesha Poon  
EYE- visit set in Aug - follows   
annually  
Dentist - every 6 months  
  
-Vaccinations  
Flu - declines  
PNA- suggest age 65  
Tdap -2018?  
Hep B - low risk  
MMR - vaccine as a child  
Shingles - suggest age 50  
Covid - first 2 shots  
  
Patient Active Problem List  
Diagnosis  
Borderline high cholesterol  
Costochondritis, acute  
Herpes simplex  
Malaise and fatigue  
Migraine  
Situational mixed anxiety and   
depressive disorder  
Weight gain  
  
Review of Systems  
Constitutional: Positive for   
fatigue. Negative for chills   
and fever.  
HENT: Negative for congestion,   
rhinorrhea, sinus pain, sore   
throat and tinnitus.  
Eyes: Negative for discharge,   
redness and visual disturbance.  
Respiratory: Negative for   
cough, chest tightness,   
shortness of breath and   
wheezing.  
Cardiovascular: Negative for   
chest pain, palpitations and   
leg swelling.  
Gastrointestinal: Negative for   
abdominal pain, constipation,   
diarrhea, nausea and vomiting.  
Endocrine: Negative for cold   
intolerance and heat   
intolerance.  
Genitourinary: Negative for   
flank pain, frequency and   
urgency.  
Musculoskeletal: Negative for   
back pain, gait problem and   
neck pain.  
Skin: Negative for rash and   
wound.  
Neurological: Negative for   
dizziness, tremors, syncope,   
numbness and headaches.  
Hematological: Does not   
bruise/bleed easily.  
Psychiatric/Behavioral:   
Negative for confusion, sleep   
disturbance and suicidal ideas.  
  
Past Medical History:  
Diagnosis Date  
Encounter for gynecological   
examination (general) (routine)   
without abnormal findings  
Pap test, as part of routine   
gynecological examination  
Other conditions influencing   
health status  
Menarche  
Other specified postprocedural   
states  
History of Papanicolaou smear  
Personal history of other   
diseases of the female genital   
tract  
History of abnormal cervical   
Papanicolaou smear  
  
Past Surgical History:  
Procedure Laterality Date  
COLPOSCOPY 2013  
  
Family History  
Problem Relation Name Age of   
Onset  
Thyroid disease Mother  
No Known Problems Father  
Emphysema Other  
Breast cancer Other  
  
Social History  
  
Tobacco Use  
Smoking status: Never  
Smokeless tobacco: Never  
Substance Use Topics  
Alcohol use: Never  
Drug use: Never  
  
No Known Allergies  
  
Current Outpatient Medications  
Medication Sig Dispense Refill  
acyclovir (Zovirax) 400 mg   
tablet Take 1 tablet (400 mg)   
by mouth. 1 TAB TID PRN  
citalopram (CeleXA) 10 mg   
tablet Take 1 tablet (10 mg) by   
mouth once daily. 30 tablet 0  
  
No current   
facility-administered   
medications for this visit.  
  
Objective  
/74 (BP Location: Left   
arm, Patient Position: Sitting)   
  Pulse 74   Ht 1.575 m (5' 2 )   
  Wt 89.8 kg (198 lb)   BMI   
36.21 kg/m  
  
Physical Exam  
Vitals reviewed.  
Constitutional:  
Appearance: Normal appearance.   
She is obese.  
HENT:  
Head: Normocephalic.  
Right Ear: External ear normal.  
Left Ear: External ear normal.  
Nose: Nose normal. No   
congestion or rhinorrhea.  
Mouth/Throat:  
Mouth: Mucous membranes are   
moist.  
Eyes:  
Extraocular Movements:   
Extraocular movements intact.  
Conjunctiva/sclera:   
Conjunctivae normal.  
Pupils: Pupils are equal,   
round, and reactive to light.  
Cardiovascular:  
Rate and Rhythm: Normal rate   
and regular rhythm.  
Pulses: Normal pulses.  
Pulmonary:  
Effort: Pulmonary effort is   
normal.  
Breath sounds: Normal breath   
sounds.  
Abdominal:  
General: Bowel sounds are   
normal.  
Palpations: Abdomen is soft.  
Tenderness: There is no   
abdominal tenderness. There is   
no right CVA tenderness or left   
CVA tenderness.  
Musculoskeletal:  
General: No tenderness. Normal   
range of motion.  
Cervical back: Normal range of   
motion and neck supple. No   
tenderness.  
Skin:  
General: Skin is warm and dry.  
Neurological:  
General: No focal deficit   
present.  
Mental Status: She is alert and   
oriented to person, place, and   
time.  
Psychiatric:  
Mood and Affect: Mood normal.  
Behavior: Behavior normal.  
  
Testing  
Component  
Latest Ref [facility] 2023  
WBC  
4.4 - 11.3 x10E9/L 7.9  
RBC  
4.00 - 5.20 x10E12/L 4.97  
HEMOGLOBIN  
12.0 - 16.0 g/dL 14.2  
HEMATOCRIT  
36.0 - 46.0 % 44.2  
MCV  
80 - 100 fL 89  
MCHC  
32.0 - 36.0 g/dL 32.1  
Platelets  
150 - 450 x10E9/L 354  
RED CELL DISTRIBUTION WIDTH  
11.5 - 14.5 % 12.8  
Neutrophils %  
40.0 - 80.0 % 55.1  
Immature Granulocytes %,   
Automated  
0.0 - 0.9 % 0.8  
Lymphocytes %  
13.0 - 44.0 % 30.2  
Monocytes %  
2.0 - 10.0 % 8.8  
Eosinophils %  
0.0 - 6.0 % 4.2  
Basophils %  
0.0 - 2.0 % 0.9  
Neutrophils Absolute  
1.20 - 7.70 x10E9/L 4.35  
Lymphocytes Absolute  
1.20 - 4.80 x10E9/L 2.38  
Monocytes Absolute  
0.10 - 1.00 x10E9/L 0.69  
Eosinophils Absolute  
0.00 - 0.70 x10E9/L 0.33  
Basophils Absolute  
0.00 - 0.10 x10E9/L 0.07  
Hemoglobin A1C  
% 5.3  
Estimated Average Glucose  
MG/  
Vitamin D, 25-Hydroxy, Total  
ng/mL 31  
FOLATE  
>5.0 ng/mL 16.6  
Vitamin B12  
211 - 911 pg/mL 358  
  
Impression  
  
MDM  
  
1) COMPLEXITY: MORE THAN 1   
STABLE CHRONIC CONDITION   
ADDRESSED  
2)DATA: TESTS INTERPRETED AND   
OR ORDERED, TOOK INDEPENDENT   
HISTORY OR RECORDS REVIEWED  
3)RISK: MODERATE RISK DUE TO   
NATURE OF MEDICAL   
CONDITIONS/COMORBIDITY OR   
MEDICATIONS ORDERED OR SURGICAL   
OR PROCEDURE REFERRAL, .  
  
  
Reviewed labs and Testing on   
file  
Patient to follow diet low in   
cholesterol, fat, and sodium.  
Patient is advised to increase   
Exercise.  
Patient is recommended to lose   
weight.  
Reviewed Meds and discussed   
common side effects  
Continue as directed  
Vit D - start weekly supplement   
and repeat in 3-6 mo  
B12 - start daily supplement   
and repeat in 3-6 mo  
Cont with specialists  
Pt to call for remaining lab   
results that were not back -   
CMP and lipid  
Aim for wellness in 1 year if   
approp based on labs  
Patient is strongly advised to   
be compliant with   
recommendations.  
Return to Clinic sooner if   
needed.  
Patient denies further   
questions/concerns at this time  
  
Assessment/Plan  
Problem List Items Addressed   
This Visit  
  
Malaise and fatigue  
Situational mixed anxiety and   
depressive disorder  
Relevant Medications  
citalopram (CeleXA) 10 mg   
tablet  
Low vitamin B12 level  
Relevant Medications  
cyanocobalamin (Vitamin B-12)   
1,000 mcg tablet  
Other Relevant Orders  
Vitamin B12  
Vitamin D deficiency  
Relevant Medications  
ergocalciferol (Vitamin D-2)   
1.25 MG (83762 UT) capsule  
Other Relevant Orders  
Vitamin D, Total  
Class 2 obesity due to excess   
calories without serious   
comorbidity with body mass   
index (BMI) of 36.0 to 36.9 in   
adult  
  
Other Visit Diagnoses  
  
Encounter for wellness   
examination in adult - Primary  
  
  
  
FU in 6 mo with labs at [facility] non   
fasting  
Electronically signed by Michelle Perez PA-C at   
2023 10:08 AM EDT  
documented in this encounter            [facility]  
Work Phone: [phone]  
  
  
  
History of Present illness Narrative 2022  
  
  
                                Note Date & Type Note            Facility  
   
                                                    2022 History of   
Present illness Narrative               Patient presents today for...1   
med checkherpes -stable on   
acyclovir prn. - denies needing   
new scriptmoodcelexa - doing   
well2 wt gain x 4-5 [id number]   
lbshe denies change in diet and   
ex and she does work out   
routinelyshe denies labs being   
done recentlyshe is working on   
diet and ex and states has lost a   
few lbshe did follow a diet and   
lost weight but has gained it   
back and in the past month has   
gained another 10 lb s                  -[facility]   
[facility]  
Work Phone:   
[phone]  
  
  
  
Evaluation note  
  
  
                                Note Date & Type Note            Facility  
  
  
  
                                          
   
                                          
   
                                          
   
                                          
   
                                          
   
                                          
   
                                          
   
                                          
  
documented in this encounter  
[facility]  
Work Phone: [phone]  
  
Evaluation note  
  
  
                                Note Date & Type Note            Facility  
  
  
  
                                          
   
                                          
   
                                          
   
                                          
   
                                          
   
                                          
   
                                          
   
                                          
  
documented in this encounter  
[facility]  
Work Phone: [phone]  
  
Evaluation note  
  
  
                                Note Date & Type Note            Facility  
  
  
  
                                          
   
                                          
   
                                          
   
                                          
   
                                          
   
                                          
   
                                          
  
documented in this encounter  
[facility]  
Work Phone: [phone]  
  
History of Present illness Narrative  
  
  
                                Note Date & Type Note            Facility  
   
                                                    History of Present illness   
Narrative                               Patient presents today for...1   
to Review labsnot done2 med   
checkherpes -stable on   
acyclovir prn.BCP - stable on   
meds and following with GYN -dr davalospt denies hx of   
anxiety or depression but   
states she is anxious, jumpy   
and notes changes in behavior   
and overwhelmed with stress at   
work and the world situationshe   
denies being on meds in the   
past for moodshe was started on   
celexa and buspar 2020she   
was doing well with these meds   
but has been out of buspar for   
a whilept states she started   
taking the buspar and this made   
her dizzy and nauseous so she   
stopped and started taking and   
old dose of celexa and feels   
like things are started to   
settle down2 L sided pain x 1   
mopt states located under her   
ribs and aggravated with cough   
sneezingcan radiate into her   
backpt denies any recent   
illnesspt denies any known   
heavy lifting but she does work   
out and liftsshe questioned if   
she pulled something and rested   
it but states not any better-   
getting worsept denies any   
known aggravation with food, no   
N/V or BM changes, no feverpt   
denies hx fo allergies or   
underlying dx of asthma/COPDpt   
denies trying topicals,   
ice/heat, ibuprofen, or   
tylenol3 wt gain x 4-5 [id number]   
lbshe denies change in diet and   
ex and she does work out   
routinelyshe denies labs being   
done recently                           [facility]   
[facility]  
Work Phone: [phone]  
  
  
  
History of Present illness Narrative  
  
  
                                Note Date & Type Note            Facility  
   
                                                    History of Present illness   
Narrative                               MICHELLE PEREZ, PAC PATIENT   
THAT Presents today for 3 MONTH   
SA LAB F/U. NO NEW   
COMPLAINTSLDL- SLIGHTLY   
ELEVATED. DIET MODIFICATIONS   
DISCUSSEDANXIETY/DEPRESSION-   
STABLE. MED REFILL                      [facility]   
[facility]  
Work Phone: [phone]  
  
  
  
History of Present illness Narrative  
  
  
                                Note Date & Type Note            Facility  
   
                                                    History of Present illness   
Narrative                               C/O WEIGHT GAIN, HAS TRIED   
DIET AND EXERCISE AND FAILED            [facility]   
[facility]  
Work Phone: [phone]  
  
  
  
Summary Purpose  
  
  
                                                      
  
  
  
Family History  
No Family History Records FoundUnknown Family Member  
  
                                Name            Dates           Details  
   
                                                    Family history of thyroid di  
sease: Mother(V18.19, Z83.49)  
                                                    Status:Active  
   
                                                    No pertinent family history:  
 Father(V49.89, Z78.9)  
                                                    Status:Active  
   
                                                    Family history of emphysema:  
 Grandparent(V17.6, Z82.5)  
                                                    Status:Active  
   
                                                    Family history of malignant   
neoplasm of female breast:   
Grandparent(V16.3, Z80.3)  
                                                    Status:Active  
  
                              Unknown Family Member  
  
                                Name            Dates           Details  
   
                                                    Family history of thyroid di  
sease: Mother(V18.19, Z83.49)  
                                                    Status:Active  
   
                                                    No pertinent family history:  
 Father(V49.89, Z78.9)  
                                                    Status:Active  
   
                                                    Family history of emphysema:  
 Grandparent(V17.6, Z82.5)  
                                                    Status:Active  
   
                                                    Family history of malignant   
neoplasm of female breast:   
Grandparent(V16.3, Z80.3)  
                                                    Status:Active  
  
                              Unknown Family Member  
  
                                Name            Dates           Details  
   
                                                    Family history of thyroid di  
sease: Mother(V18.19, Z83.49)  
                                                    Status:Active  
   
                                                    No pertinent family history:  
 Father(V49.89, Z78.9)  
                                                    Status:Active  
   
                                                    Family history of emphysema:  
 Grandparent(V17.6, Z82.5)  
                                                    Status:Active  
   
                                                    Family history of malignant   
neoplasm of female breast:   
Grandparent(V16.3, Z80.3)  
                                                    Status:Active  
  
                              Unknown Family Member  
  
                                Name            Dates           Details  
   
                                                    Family history of thyroid di  
sease: Mother(V18.19, Z83.49)  
                                                    Status:Active  
   
                                                    No pertinent family history:  
 Father(V49.89, Z78.9)  
                                                    Status:Active  
   
                                                    Family history of emphysema:  
 Grandparent(V17.6, Z82.5)  
                                                    Status:Active  
   
                                                    Family history of malignant   
neoplasm of female breast:   
Grandparent(V16.3, Z80.3)  
                                                    Status:Active  
  
                              Unknown Family Member  
  
                                Name            Dates           Details  
   
                                                    Family history of thyroid di  
sease: Mother(V18.19, Z83.49)  
                                                    Status:Active  
   
                                                    No pertinent family history:  
 Father(V49.89, Z78.9)  
                                                    Status:Active  
   
                                                    Family history of emphysema:  
 Grandparent(V17.6, Z82.5)  
                                                    Status:Active  
   
                                                    Family history of malignant   
neoplasm of female breast:   
Grandparent(V16.3, Z80.3)  
                                                    Status:Active  
  
                              Unknown Family Member  
  
                                Name            Dates           Details  
   
                                                    Family history of thyroid di  
sease: Mother(V18.19, Z83.49)  
                                                    Status:Active  
   
                                                    No pertinent family history:  
 Father(V49.89, Z78.9)  
                                                    Status:Active  
   
                                                    Family history of emphysema:  
 Grandparent(V17.6, Z82.5)  
                                                    Status:Active  
   
                                                    Family history of malignant   
neoplasm of female breast:   
Grandparent(V16.3, Z80.3)  
                                                    Status:Active  
  
                              Unknown Family Member  
  
                                Name            Dates           Details  
   
                                                    Family history of thyroid di  
sease: Mother(V18.19, Z83.49)  
                                                    Status:Active  
   
                                                    No pertinent family history:  
 Father(V49.89, Z78.9)  
                                                    Status:Active  
   
                                                    Family history of emphysema:  
 Grandparent(V17.6, Z82.5)  
                                                    Status:Active  
   
                                                    Family history of malignant   
neoplasm of female breast:   
Grandparent(V16.3, Z80.3)  
                                                    Status:Active  
  
                              Unknown Family Member  
  
                                Name            Dates           Details  
   
                                                    Family history of thyroid di  
sease: Mother(V18.19, Z83.49)  
                                                    Status:Active  
   
                                                    No pertinent family history:  
 Father(V49.89, Z78.9)  
                                                    Status:Active  
   
                                                    Family history of emphysema:  
 Grandparent(V17.6, Z82.5)  
                                                    Status:Active  
   
                                                    Family history of malignant   
neoplasm of female breast:   
Grandparent(V16.3, Z80.3)  
                                                    Status:Active  
  
                              Unknown Family Member  
  
                                Name            Dates           Details  
   
                                                    Family history of thyroid di  
sease: Mother(V18.19, Z83.49)  
                                                    Status:Active  
   
                                                    No pertinent family history:  
 Father(V49.89, Z78.9)  
                                                    Status:Active  
   
                                                    Family history of emphysema:  
 Grandparent(V17.6, Z82.5)  
                                                    Status:Active  
   
                                                    Family history of malignant   
neoplasm of female breast:   
Grandparent(V16.3, Z80.3)  
                                                    Status:Active  
  
  
  
Advance Directives  
No Advanced Directives Records FoundNo Advanced Directives Records FoundNo 
Advanced Directives Records FoundNo Advanced Directives Records FoundNo Advanced
 Directives Records FoundNo Advanced Directives Records FoundNo Advanced 
Directives Records Found  
  
Chief Complaint  
3-8WKS F/U W/LABS AND MEDS WANTED TO DISCUSS NEW MEDICATION...POSSIBLY CHANGING 
DUE TO NAUSEA, LIGHT HEADED FEELING...NO OTHER CONCERNS3 MONTH F/U WITH LABS. NO
 COMPLAINTS TODAY.6 MONTH F/U MED CHECK - NO COMPLAINTS TODAY.C/O WEIGHT GAIN  
  
Additional Source Comments  
  
  
  
                                                    INFORMATION SOURCE (unrecogn  
ized section and content)  
   
                                          
  
  
  
                                          
   
                                          
  
  
  
                                DATE CREATED    AUTHOR          AUTHOR'S ORGANIZ  
ATION  
   
                                2019                      [facility]  
  
  
  
                                DATE CREATED    AUTHOR          AUTHOR'S ORGANIZ  
ATION  
   
                                08/15/2022                       [employer]  
  
  
  
                                DATE CREATED    AUTHOR          AUTHOR'S ORGANIZ  
ATION  
   
                                2023                      [facility]  
  
  
  
                                DATE CREATED    AUTHOR          AUTHOR'S ORGANIZ  
ATION  
   
                                07/15/2023                      [facility]  
  
  
  
                                DATE CREATED    AUTHOR          AUTHOR'S ORGANIZ  
ATION  
   
                                2024                      [facility]  
  
  
  
                                DATE CREATED    AUTHOR          AUTHOR'S ORGANIZ  
ATION  
   
                                2024                      [facility]  
  
  
  
  
  
                                                    Reason for Visit (unrecogniz  
ed section and content)  
   
                                          
  
  
  
                                          
   
                                          
  
  
  
                                        Reason              Comments  
   
                                        Follow-up           6 MONTH F/U WITH LAB  
S. NO CONCERNS  
  
  
  
                                        Reason              Comments  
   
                                        Follow-up           1 MONTH FOLLOW UP  
  
  
  
  
  
                                                    Care Teams (unrecognized sec  
tion and content)  
   
                                          
  
  
  
                                          
   
                                          
  
  
  
                      Team Member Relationship Specialty  Start Date End Date  
   
                                                      
  
  
Michelle Perez PA-C  
  
  
NPI: [id number]  
  
  
 [address]  
  
  
[place] [address]  
  
  
[phone] (Work)  
  
  
[phone] (Fax) PCP - General                   20           
   
                                                      
  
  
Arvin Garcia MD  
  
  
NPI: [id number]  
  
  
 [place] [address] [place] [address]  
  
  
[phone] (Work)  
  
  
[phone] (Fax) PCP - Noah ACO PCP                 23            
  
  
  
                      Team Member Relationship Specialty  Start Date End Date  
   
                                                      
  
  
Michelle Perez PA-C  
  
  
NPI: [id number]  
  
  
 [address]  
  
  
[address]  
  
  
[place] [address]  
  
  
[phone] (Work)  
  
  
[phone] (Fax) PCP - General                   20           
   
                                                      
  
  
Arvin Garcia MD  
  
  
NPI: [id number]  
  
  
 [address]  
  
  
[address]  
  
  
[place] [address]  
  
  
[phone] (Work)  
  
  
[phone] (Fax) PCP - Noah SWEET PCP                 23            
  
  
FOR RECORDS PERTAINING TO PATIENTS WHO ARE OR HAVE BEEN ENROLLED IN A CHEMICAL 
DEPENDENCY/SUBSTANCEABUSE PROGRAM, SOME INFORMATION MAY BE OMITTED. This 
clinical summary was aggregated from multiple sources. Caution should be 
exercised in using it in the provision of clinical care. This summary normalizes
 information from multiple sources, and as a consequence, information in this 
document may materially change the coding, format and clinical context of 
patient data. In addition, data may be omitted in some cases. CLINICAL DECISIONS
 SHOULD BE BASED ON THE PRIMARY CLINICAL RECORDS. [facility]. provides
 no warranty or guarantee of the accuracy or completeness of information in this
 document.

## 2024-06-03 ENCOUNTER — LAB (OUTPATIENT)
Dept: LAB | Facility: LAB | Age: 34
End: 2024-06-03
Payer: COMMERCIAL

## 2024-06-03 DIAGNOSIS — E55.9 VITAMIN D DEFICIENCY: ICD-10-CM

## 2024-06-03 DIAGNOSIS — R79.89 LOW VITAMIN B12 LEVEL: ICD-10-CM

## 2024-06-03 DIAGNOSIS — E03.9 ACQUIRED HYPOTHYROIDISM: ICD-10-CM

## 2024-06-03 LAB
25(OH)D3 SERPL-MCNC: 29 NG/ML (ref 30–100)
T4 FREE SERPL-MCNC: 0.54 NG/DL (ref 0.61–1.12)
TSH SERPL-ACNC: 3.2 MIU/L (ref 0.44–3.98)
VIT B12 SERPL-MCNC: 486 PG/ML (ref 211–911)

## 2024-06-03 PROCEDURE — 84439 ASSAY OF FREE THYROXINE: CPT

## 2024-06-03 PROCEDURE — 84443 ASSAY THYROID STIM HORMONE: CPT

## 2024-06-03 PROCEDURE — 82306 VITAMIN D 25 HYDROXY: CPT

## 2024-06-03 PROCEDURE — 36415 COLL VENOUS BLD VENIPUNCTURE: CPT

## 2024-06-03 PROCEDURE — 82607 VITAMIN B-12: CPT

## 2024-06-04 ENCOUNTER — OFFICE VISIT (OUTPATIENT)
Dept: PRIMARY CARE | Facility: CLINIC | Age: 34
End: 2024-06-04
Payer: COMMERCIAL

## 2024-06-04 VITALS
BODY MASS INDEX: 37.73 KG/M2 | DIASTOLIC BLOOD PRESSURE: 83 MMHG | WEIGHT: 205 LBS | SYSTOLIC BLOOD PRESSURE: 115 MMHG | HEIGHT: 62 IN | HEART RATE: 82 BPM

## 2024-06-04 DIAGNOSIS — E55.9 VITAMIN D DEFICIENCY: ICD-10-CM

## 2024-06-04 DIAGNOSIS — E03.9 ACQUIRED HYPOTHYROIDISM: Primary | ICD-10-CM

## 2024-06-04 DIAGNOSIS — R79.89 LOW VITAMIN B12 LEVEL: ICD-10-CM

## 2024-06-04 DIAGNOSIS — F43.23 SITUATIONAL MIXED ANXIETY AND DEPRESSIVE DISORDER: ICD-10-CM

## 2024-06-04 DIAGNOSIS — E66.09 CLASS 2 OBESITY DUE TO EXCESS CALORIES WITHOUT SERIOUS COMORBIDITY WITH BODY MASS INDEX (BMI) OF 37.0 TO 37.9 IN ADULT: ICD-10-CM

## 2024-06-04 PROBLEM — R68.89 POSITIVE MEASUREMENT FINDING: Status: ACTIVE | Noted: 2024-06-04

## 2024-06-04 PROBLEM — L70.9 ACNE: Status: RESOLVED | Noted: 2024-06-04 | Resolved: 2024-06-04

## 2024-06-04 PROCEDURE — 1036F TOBACCO NON-USER: CPT | Performed by: PHYSICIAN ASSISTANT

## 2024-06-04 PROCEDURE — 3008F BODY MASS INDEX DOCD: CPT | Performed by: PHYSICIAN ASSISTANT

## 2024-06-04 PROCEDURE — 99214 OFFICE O/P EST MOD 30 MIN: CPT | Performed by: PHYSICIAN ASSISTANT

## 2024-06-04 RX ORDER — LANOLIN ALCOHOL/MO/W.PET/CERES
CREAM (GRAM) TOPICAL
Qty: 15 TABLET | Refills: 11 | Status: SHIPPED | OUTPATIENT
Start: 2024-06-04

## 2024-06-04 RX ORDER — ERGOCALCIFEROL 1.25 MG/1
50000 CAPSULE ORAL
Qty: 5 CAPSULE | Refills: 11 | Status: SHIPPED | OUTPATIENT
Start: 2024-06-09 | End: 2025-08-03

## 2024-06-04 RX ORDER — LEVOTHYROXINE SODIUM 25 UG/1
TABLET ORAL
Qty: 45 TABLET | Refills: 11 | Status: SHIPPED | OUTPATIENT
Start: 2024-06-04

## 2024-06-04 ASSESSMENT — ENCOUNTER SYMPTOMS
CHILLS: 0
SORE THROAT: 0
DYSPHORIC MOOD: 1
SINUS PAIN: 0
FEVER: 0
NECK PAIN: 0
DIZZINESS: 0
BRUISES/BLEEDS EASILY: 0
NAUSEA: 0
FLANK PAIN: 0
COUGH: 0
WHEEZING: 0
NERVOUS/ANXIOUS: 1
CONSTIPATION: 0
SLEEP DISTURBANCE: 0
EYE REDNESS: 0
NUMBNESS: 0
HEADACHES: 0
CHEST TIGHTNESS: 0
EYE DISCHARGE: 0
BACK PAIN: 0
DIARRHEA: 0
CONFUSION: 0
ABDOMINAL PAIN: 0
FREQUENCY: 0
RHINORRHEA: 0
TREMORS: 0
WOUND: 0
SHORTNESS OF BREATH: 0
FATIGUE: 0
PALPITATIONS: 0
VOMITING: 0

## 2024-06-04 NOTE — PROGRESS NOTES
Subjective   Patient ID: Sandra Crouch is a 33 y.o. female who presents for Follow-up (3-4 MONTH FOLLOW UP +LABS)    HPI    -Labs -      - med check   herpes -stable on acyclovir prn.    hypothyroid      mood  Anxiety and depression -celexa 40 and unsure of change - consider change in med or add on meds- pt would like to wait till sept and see how the inc in vit and thyroid make a diff before we change her mood meds      Vit D - 1/week - has been off for sometime  B12 - every other day      -Preventative Testing   PAP - Following with GYN last PAP -2022- Lisette Teresa - Peg  Mammo- fam hx of breast Cancer but in older age - suggest age 40   DEXA - suggest 55   Colonoscopy - Suggest age 45-50   Fall - No JAN 2024    PHQ2 - No Jan 2024      -Other Providers   GYN - Lisette Teresa  EYE- visit set in Aug - follows annually   Dentist - every 6 months           Patient Active Problem List   Diagnosis    Borderline high cholesterol    Herpes simplex    Malaise and fatigue    Migraine    Situational mixed anxiety and depressive disorder    Weight gain    Low vitamin B12 level    Vitamin D deficiency    Class 2 obesity due to excess calories without serious comorbidity with body mass index (BMI) of 37.0 to 37.9 in adult    Acquired hypothyroidism    Positive measurement finding       Review of Systems   Constitutional:  Negative for chills, fatigue and fever.   HENT:  Negative for congestion, rhinorrhea, sinus pain, sore throat and tinnitus.    Eyes:  Negative for discharge, redness and visual disturbance.   Respiratory:  Negative for cough, chest tightness, shortness of breath and wheezing.    Cardiovascular:  Negative for chest pain, palpitations and leg swelling.   Gastrointestinal:  Negative for abdominal pain, constipation, diarrhea, nausea and vomiting.   Endocrine: Negative for cold intolerance and heat intolerance.   Genitourinary:  Negative for flank pain, frequency and urgency.   Musculoskeletal:   Negative for back pain, gait problem and neck pain.   Skin:  Negative for rash and wound.   Neurological:  Negative for dizziness, tremors, syncope, numbness and headaches.   Hematological:  Does not bruise/bleed easily.   Psychiatric/Behavioral:  Positive for dysphoric mood. Negative for confusion, sleep disturbance and suicidal ideas. The patient is nervous/anxious.        Past Medical History:   Diagnosis Date    Acne 06/04/2024    Encounter for gynecological examination (general) (routine) without abnormal findings     Pap test, as part of routine gynecological examination    High thyroid stimulating hormone (TSH) level 02/29/2024    Hypercholesterolemia 02/29/2024    Other conditions influencing health status     Menarche    Other specified postprocedural states     History of Papanicolaou smear    Personal history of other diseases of the female genital tract     History of abnormal cervical Papanicolaou smear       Past Surgical History:   Procedure Laterality Date    COLPOSCOPY  03/29/2013       Family History   Problem Relation Name Age of Onset    Thyroid disease Mother      No Known Problems Father      Emphysema Other      Breast cancer Other         Social History     Tobacco Use    Smoking status: Never    Smokeless tobacco: Never   Vaping Use    Vaping status: Never Used   Substance Use Topics    Alcohol use: Never    Drug use: Never       No Known Allergies    Current Outpatient Medications   Medication Sig Dispense Refill    acyclovir (Zovirax) 400 mg tablet Take 1 tablet (400 mg) by mouth. 1 TAB TID PRN      citalopram (CeleXA) 40 mg tablet Take 1 tablet (40 mg) by mouth once daily. 30 tablet 11    cyanocobalamin (Vitamin B-12) 1,000 mcg tablet Take 1 tablet (1,000 mcg) by mouth once daily. 30 tablet 11    ergocalciferol (Vitamin D-2) 1.25 MG (48814 UT) capsule Take 1 capsule (50,000 Units) by mouth 1 (one) time per week. 5 capsule 11    levothyroxine (Synthroid, Levoxyl) 25 mcg tablet Take 1  "tablet (25 mcg) by mouth once daily. 30 tablet 11     No current facility-administered medications for this visit.       Objective   /83   Pulse 82   Ht 1.575 m (5' 2\")   Wt 93 kg (205 lb)   BMI 37.49 kg/m²     Physical Exam  Vitals reviewed.   Constitutional:       Appearance: Normal appearance. She is obese.   HENT:      Head: Normocephalic.      Right Ear: External ear normal.      Left Ear: External ear normal.      Nose: Nose normal. No congestion or rhinorrhea.      Mouth/Throat:      Mouth: Mucous membranes are moist.   Eyes:      Extraocular Movements: Extraocular movements intact.      Conjunctiva/sclera: Conjunctivae normal.      Pupils: Pupils are equal, round, and reactive to light.   Cardiovascular:      Rate and Rhythm: Normal rate and regular rhythm.      Pulses: Normal pulses.   Pulmonary:      Effort: Pulmonary effort is normal.      Breath sounds: Normal breath sounds.   Abdominal:      General: Bowel sounds are normal.      Palpations: Abdomen is soft.      Tenderness: There is no abdominal tenderness. There is no right CVA tenderness or left CVA tenderness.   Musculoskeletal:         General: No tenderness. Normal range of motion.      Cervical back: Normal range of motion and neck supple. No tenderness.   Skin:     General: Skin is warm and dry.   Neurological:      General: No focal deficit present.      Mental Status: She is alert and oriented to person, place, and time.   Psychiatric:         Mood and Affect: Mood normal.         Behavior: Behavior normal.       Testing   Component      Latest Ref [facility] 6/3/2024   Thyroid Stimulating Hormone      0.44 - 3.98 mIU/L 3.20    Thyroxine, Free      0.61 - 1.12 ng/dL 0.54 (L)    Vitamin B12      211 - 911 pg/mL 486    Vitamin D, 25-Hydroxy, Total      30 - 100 ng/mL 29 (L)         Impression    MDM    1) COMPLEXITY: MORE THAN 1 STABLE CHRONIC CONDITION ADDRESSED  2)DATA: TESTS INTERPRETED AND OR ORDERED, TOOK INDEPENDENT HISTORY OR RECORDS " REVIEWED  3)RISK: MODERATE RISK DUE TO NATURE OF MEDICAL CONDITIONS/COMORBIDITY OR MEDICATIONS ORDERED OR SURGICAL OR PROCEDURE REFERRAL, .       Reviewed labs and Testing on file   Patient to follow diet low in cholesterol, fat, and sodium.    Patient is advised to increase Exercise.  Patient is recommended to lose weight.  Reviewed Meds and discussed common side effects  Continue as directed   Thyroid - inc meds and re-eval   Vit D - start supplement   Patient is strongly advised to be compliant with recommendations.    Return to Clinic sooner if needed.  Patient denies further questions/concerns at this time     Assessment/Plan   Problem List Items Addressed This Visit             ICD-10-CM    Situational mixed anxiety and depressive disorder F43.23    Low vitamin B12 level R79.89    Relevant Medications    cyanocobalamin (Vitamin B-12) 1,000 mcg tablet    Other Relevant Orders    Vitamin B12    Vitamin D deficiency E55.9    Relevant Medications    ergocalciferol (Vitamin D-2) 1.25 MG (62731 UT) capsule (Start on 6/9/2024)    Other Relevant Orders    Vitamin D 25-Hydroxy,Total (for eval of Vitamin D levels)    Class 2 obesity due to excess calories without serious comorbidity with body mass index (BMI) of 37.0 to 37.9 in adult E66.09, Z68.37    Acquired hypothyroidism - Primary E03.9    Relevant Medications    levothyroxine (Synthroid, Levoxyl) 25 mcg tablet    Other Relevant Orders    Thyroid Stimulating Hormone    Thyroxine, Free        Fu in 3 mo with labs at [facility] fasting and med check

## 2024-09-04 ENCOUNTER — APPOINTMENT (OUTPATIENT)
Dept: PRIMARY CARE | Facility: CLINIC | Age: 34
End: 2024-09-04
Payer: COMMERCIAL

## 2024-09-07 ENCOUNTER — LAB (OUTPATIENT)
Dept: LAB | Facility: LAB | Age: 34
End: 2024-09-07
Payer: COMMERCIAL

## 2024-09-07 DIAGNOSIS — E03.9 ACQUIRED HYPOTHYROIDISM: ICD-10-CM

## 2024-09-07 DIAGNOSIS — R79.89 LOW VITAMIN B12 LEVEL: ICD-10-CM

## 2024-09-07 DIAGNOSIS — E55.9 VITAMIN D DEFICIENCY: ICD-10-CM

## 2024-09-07 LAB
25(OH)D3 SERPL-MCNC: 28 NG/ML (ref 30–100)
T4 FREE SERPL-MCNC: 0.69 NG/DL (ref 0.61–1.12)
TSH SERPL-ACNC: 3.83 MIU/L (ref 0.44–3.98)
VIT B12 SERPL-MCNC: 724 PG/ML (ref 211–911)

## 2024-09-07 PROCEDURE — 84443 ASSAY THYROID STIM HORMONE: CPT

## 2024-09-07 PROCEDURE — 82607 VITAMIN B-12: CPT

## 2024-09-07 PROCEDURE — 82306 VITAMIN D 25 HYDROXY: CPT

## 2024-09-07 PROCEDURE — 36415 COLL VENOUS BLD VENIPUNCTURE: CPT

## 2024-09-07 PROCEDURE — 84439 ASSAY OF FREE THYROXINE: CPT

## 2024-09-09 ENCOUNTER — APPOINTMENT (OUTPATIENT)
Dept: PRIMARY CARE | Facility: CLINIC | Age: 34
End: 2024-09-09
Payer: COMMERCIAL

## 2024-09-09 VITALS
HEART RATE: 73 BPM | SYSTOLIC BLOOD PRESSURE: 103 MMHG | HEIGHT: 62 IN | WEIGHT: 211 LBS | DIASTOLIC BLOOD PRESSURE: 70 MMHG | BODY MASS INDEX: 38.83 KG/M2

## 2024-09-09 DIAGNOSIS — E03.9 ACQUIRED HYPOTHYROIDISM: Primary | ICD-10-CM

## 2024-09-09 DIAGNOSIS — E55.9 VITAMIN D DEFICIENCY: ICD-10-CM

## 2024-09-09 DIAGNOSIS — R53.81 MALAISE AND FATIGUE: ICD-10-CM

## 2024-09-09 DIAGNOSIS — R53.83 MALAISE AND FATIGUE: ICD-10-CM

## 2024-09-09 DIAGNOSIS — E66.09 CLASS 2 OBESITY DUE TO EXCESS CALORIES WITHOUT SERIOUS COMORBIDITY WITH BODY MASS INDEX (BMI) OF 38.0 TO 38.9 IN ADULT: ICD-10-CM

## 2024-09-09 DIAGNOSIS — R79.89 LOW VITAMIN B12 LEVEL: ICD-10-CM

## 2024-09-09 DIAGNOSIS — F43.23 SITUATIONAL MIXED ANXIETY AND DEPRESSIVE DISORDER: ICD-10-CM

## 2024-09-09 DIAGNOSIS — R63.5 WEIGHT GAIN: ICD-10-CM

## 2024-09-09 PROCEDURE — 99214 OFFICE O/P EST MOD 30 MIN: CPT | Performed by: PHYSICIAN ASSISTANT

## 2024-09-09 PROCEDURE — 3008F BODY MASS INDEX DOCD: CPT | Performed by: PHYSICIAN ASSISTANT

## 2024-09-09 PROCEDURE — 1036F TOBACCO NON-USER: CPT | Performed by: PHYSICIAN ASSISTANT

## 2024-09-09 RX ORDER — LEVOTHYROXINE SODIUM 50 UG/1
50 TABLET ORAL DAILY
Qty: 30 TABLET | Refills: 11 | Status: SHIPPED | OUTPATIENT
Start: 2024-09-09

## 2024-09-09 ASSESSMENT — ENCOUNTER SYMPTOMS
SORE THROAT: 0
RHINORRHEA: 0
CHILLS: 0
BACK PAIN: 0
SLEEP DISTURBANCE: 0
FLANK PAIN: 0
ABDOMINAL PAIN: 0
DIARRHEA: 0
WHEEZING: 0
PALPITATIONS: 0
EYE REDNESS: 0
NUMBNESS: 0
HEADACHES: 0
WOUND: 0
BRUISES/BLEEDS EASILY: 0
DIZZINESS: 0
NECK PAIN: 0
UNEXPECTED WEIGHT CHANGE: 1
TREMORS: 0
FEVER: 0
FREQUENCY: 0
CONFUSION: 0
NAUSEA: 0
SHORTNESS OF BREATH: 0
EYE DISCHARGE: 0
CONSTIPATION: 0
VOMITING: 0
SINUS PAIN: 0
CHEST TIGHTNESS: 0
COUGH: 0
FATIGUE: 1

## 2024-09-09 NOTE — PROGRESS NOTES
Subjective   Patient ID: Sandra Crouch is a 33 y.o. female who presents for Follow-up (3 MONTH F/U WITH LABS. )    HPI    -Labs -      - med check   herpes -stable on acyclovir prn.     Hypothyroid- stable on meds - dose was inc and labs are Wnl but borderline and denies improvement with her symptoms - will inc again but suggest repeat in 3 mo to re-eval with her symptoms  (Fatigue, wt gain, etc)- I do question if the celexa is the cause of the wt gain and consider taper down       mood  Anxiety and depression -celexa 40 and mood is stable but possible cause of wt gain - discussed titrate alt days 20 and 40 mg daily x 2-4 weeks and monitor wt - consider further deca and titrate off or to a different medicine    Wt gain x 3 years and denies change in diet and ex   Consider additional lab work up but I question if thyroid or celexa caused - will look into these aspects first      Vit D - 1/week - has been off for sometime- check with the pharm for RF  B12 - every other day      -Preventative Testing   PAP - Following with GYN last PAP -2022- [facility] - [place]  Mammo- fam hx of breast Cancer but in older age - suggest age 40   DEXA - suggest 55   Colonoscopy - Suggest age 45-50   Fall - No JAN 2024    PHQ2 - No Jan 2024      -Other Providers   GYN - [facility]  EYE- visit set in Aug - follows annually   Dentist - every 6 months          Patient Active Problem List   Diagnosis    Borderline high cholesterol    Herpes simplex    Malaise and fatigue    Migraine    Situational mixed anxiety and depressive disorder    Weight gain    Low vitamin B12 level    Vitamin D deficiency    Class 2 obesity due to excess calories without serious comorbidity with body mass index (BMI) of 37.0 to 37.9 in adult    Acquired hypothyroidism    Positive measurement finding       Review of Systems   Constitutional:  Positive for fatigue and unexpected weight change. Negative for chills and fever.   HENT:  Negative for  congestion, rhinorrhea, sinus pain, sore throat and tinnitus.    Eyes:  Negative for discharge, redness and visual disturbance.   Respiratory:  Negative for cough, chest tightness, shortness of breath and wheezing.    Cardiovascular:  Negative for chest pain, palpitations and leg swelling.   Gastrointestinal:  Negative for abdominal pain, constipation, diarrhea, nausea and vomiting.   Endocrine: Negative for cold intolerance and heat intolerance.   Genitourinary:  Negative for flank pain, frequency and urgency.   Musculoskeletal:  Negative for back pain, gait problem and neck pain.   Skin:  Negative for rash and wound.   Neurological:  Negative for dizziness, tremors, syncope, numbness and headaches.   Hematological:  Does not bruise/bleed easily.   Psychiatric/Behavioral:  Negative for confusion, sleep disturbance and suicidal ideas.        Past Medical History:   Diagnosis Date    Acne 06/04/2024    Encounter for gynecological examination (general) (routine) without abnormal findings     Pap test, as part of routine gynecological examination    High thyroid stimulating hormone (TSH) level 02/29/2024    Hypercholesterolemia 02/29/2024    Other conditions influencing health status     Menarche    Other specified postprocedural states     History of Papanicolaou smear    Personal history of other diseases of the female genital tract     History of abnormal cervical Papanicolaou smear       Past Surgical History:   Procedure Laterality Date    COLPOSCOPY  03/29/2013       Family History   Problem Relation Name Age of Onset    Thyroid disease Mother      No Known Problems Father      Emphysema Other      Breast cancer Other         Social History     Tobacco Use    Smoking status: Never    Smokeless tobacco: Never   Vaping Use    Vaping status: Never Used   Substance Use Topics    Alcohol use: Never    Drug use: Never       No Known Allergies    Current Outpatient Medications   Medication Sig Dispense Refill     "acyclovir (Zovirax) 400 mg tablet Take 1 tablet (400 mg) by mouth. 1 TAB TID PRN      citalopram (CeleXA) 40 mg tablet Take 1 tablet (40 mg) by mouth once daily. 30 tablet 11    cyanocobalamin (Vitamin B-12) 1,000 mcg tablet Take 1 tab every other day 15 tablet 11    ergocalciferol (Vitamin D-2) 1.25 MG (94033 UT) capsule Take 1 capsule (50,000 Units) by mouth 1 (one) time per week. 5 capsule 11    levothyroxine (Synthroid, Levoxyl) 25 mcg tablet Alt days taking 1 tab po and 2  tab po daily 45 tablet 11     No current facility-administered medications for this visit.       Objective   /70   Pulse 73   Ht 1.575 m (5' 2\")   Wt 95.7 kg (211 lb)   BMI 38.59 kg/m²     Physical Exam  Vitals reviewed.   Constitutional:       Appearance: Normal appearance. She is obese.   HENT:      Head: Normocephalic.      Right Ear: External ear normal.      Left Ear: External ear normal.      Nose: Nose normal. No congestion or rhinorrhea.      Mouth/Throat:      Mouth: Mucous membranes are moist.   Eyes:      Extraocular Movements: Extraocular movements intact.      Conjunctiva/sclera: Conjunctivae normal.      Pupils: Pupils are equal, round, and reactive to light.   Cardiovascular:      Rate and Rhythm: Normal rate and regular rhythm.      Pulses: Normal pulses.   Pulmonary:      Effort: Pulmonary effort is normal.      Breath sounds: Normal breath sounds.   Abdominal:      General: Bowel sounds are normal.      Palpations: Abdomen is soft.      Tenderness: There is no abdominal tenderness. There is no right CVA tenderness or left CVA tenderness.   Musculoskeletal:         General: No tenderness. Normal range of motion.      Cervical back: Normal range of motion and neck supple. No tenderness.   Skin:     General: Skin is warm and dry.   Neurological:      General: No focal deficit present.      Mental Status: She is alert and oriented to person, place, and time.   Psychiatric:         Mood and Affect: Mood normal.         " Behavior: Behavior normal.         Testing   Component      Latest Ref Rng 9/7/2024   Thyroid Stimulating Hormone      0.44 - 3.98 mIU/L 3.83    Thyroxine, Free      0.61 - 1.12 ng/dL 0.69    Vitamin B12      211 - 911 pg/mL 724    Vitamin D, 25-Hydroxy, Total      30 - 100 ng/mL 28 (L)         Impression    MDM    1) COMPLEXITY: 1 UNDIAGNOSED NEW PROBLEM WITH UNCERTAIN PROGNOSIS  2)DATA: TESTS INTERPRETED AND OR ORDERED, TOOK INDEPENDENT HISTORY OR RECORDS REVIEWED  3)RISK: MODERATE RISK DUE TO NATURE OF MEDICAL CONDITIONS/COMORBIDITY OR MEDICATIONS ORDERED OR SURGICAL OR PROCEDURE REFERRAL, .     Reviewed labs and Testing on file   Patient to follow diet low in cholesterol, fat, and sodium.    Patient is advised to increase Exercise.  Patient is recommended to lose weight.  Reviewed Meds and discussed common side effects  Continue as directed   Wt gain - suspect secondary to thyroid or celexa will make changes and monitor but consider need for additional work up as discussed   Patient is strongly advised to be compliant with recommendations.    Return to Clinic sooner if needed.  Patient denies further questions/concerns at this time     Assessment/Plan   Problem List Items Addressed This Visit             ICD-10-CM    Malaise and fatigue R53.81, R53.83    Situational mixed anxiety and depressive disorder F43.23    Weight gain R63.5    Low vitamin B12 level R79.89    Relevant Orders    Vitamin B12    Vitamin D deficiency E55.9    Relevant Orders    Vitamin D 25-Hydroxy,Total (for eval of Vitamin D levels)    Class 2 obesity due to excess calories without serious comorbidity with body mass index (BMI) of 38.0 to 38.9 in adult E66.09, Z68.38    Acquired hypothyroidism - Primary E03.9    Relevant Medications    levothyroxine (Synthroid, Levoxyl) 50 mcg tablet    Other Relevant Orders    Thyroid Stimulating Hormone    Thyroxine, Free        Fu in 3-4 mo with labs at [facility] non fasting and med check

## 2024-12-30 ENCOUNTER — LAB (OUTPATIENT)
Dept: LAB | Facility: LAB | Age: 34
End: 2024-12-30
Payer: COMMERCIAL

## 2024-12-30 DIAGNOSIS — E55.9 VITAMIN D DEFICIENCY: ICD-10-CM

## 2024-12-30 DIAGNOSIS — E03.9 ACQUIRED HYPOTHYROIDISM: ICD-10-CM

## 2024-12-30 DIAGNOSIS — R79.89 LOW VITAMIN B12 LEVEL: ICD-10-CM

## 2024-12-30 LAB
25(OH)D3 SERPL-MCNC: 25 NG/ML (ref 30–100)
T4 FREE SERPL-MCNC: 0.48 NG/DL (ref 0.61–1.12)
TSH SERPL-ACNC: 4.45 MIU/L (ref 0.44–3.98)
VIT B12 SERPL-MCNC: 608 PG/ML (ref 211–911)

## 2024-12-30 PROCEDURE — 84439 ASSAY OF FREE THYROXINE: CPT

## 2024-12-30 PROCEDURE — 82306 VITAMIN D 25 HYDROXY: CPT

## 2024-12-30 PROCEDURE — 84443 ASSAY THYROID STIM HORMONE: CPT

## 2024-12-30 PROCEDURE — 82607 VITAMIN B-12: CPT

## 2024-12-31 ENCOUNTER — APPOINTMENT (OUTPATIENT)
Dept: PRIMARY CARE | Facility: CLINIC | Age: 34
End: 2024-12-31
Payer: COMMERCIAL

## 2025-01-02 ENCOUNTER — TELEPHONE (OUTPATIENT)
Dept: PRIMARY CARE | Facility: CLINIC | Age: 35
End: 2025-01-02
Payer: COMMERCIAL

## 2025-01-02 NOTE — TELEPHONE ENCOUNTER
----- Message from Veronica Nagy sent at 12/31/2024  6:59 PM EST -----  Please call the patient regarding her abnormal result.  I believe pt had a visit this week but was canceled  Thyroid is min off and vit D low - suggest visit in the near future so we can further discuss and consider med changes   Thanks

## 2025-01-20 ENCOUNTER — APPOINTMENT (OUTPATIENT)
Dept: PRIMARY CARE | Facility: CLINIC | Age: 35
End: 2025-01-20
Payer: COMMERCIAL

## 2025-02-05 DIAGNOSIS — F43.23 SITUATIONAL MIXED ANXIETY AND DEPRESSIVE DISORDER: ICD-10-CM

## 2025-02-05 RX ORDER — CITALOPRAM 40 MG/1
40 TABLET, FILM COATED ORAL DAILY
Qty: 30 TABLET | Refills: 5 | Status: SHIPPED | OUTPATIENT
Start: 2025-02-05

## 2025-02-05 NOTE — TELEPHONE ENCOUNTER
Approved meds   She canceled her missed her last 2 visits to review labs and I believe she notified her labs were off- nothing urgent but she should reschedule her visit so we can further discuss in the near future when possible   Thanks

## 2025-02-11 ENCOUNTER — OFFICE VISIT (OUTPATIENT)
Dept: PRIMARY CARE | Facility: CLINIC | Age: 35
End: 2025-02-11
Payer: COMMERCIAL

## 2025-02-11 VITALS
HEIGHT: 62 IN | HEART RATE: 73 BPM | BODY MASS INDEX: 39.9 KG/M2 | OXYGEN SATURATION: 97 % | SYSTOLIC BLOOD PRESSURE: 103 MMHG | DIASTOLIC BLOOD PRESSURE: 71 MMHG | WEIGHT: 216.8 LBS

## 2025-02-11 DIAGNOSIS — E55.9 VITAMIN D DEFICIENCY: ICD-10-CM

## 2025-02-11 DIAGNOSIS — R53.81 MALAISE AND FATIGUE: ICD-10-CM

## 2025-02-11 DIAGNOSIS — R53.83 MALAISE AND FATIGUE: ICD-10-CM

## 2025-02-11 DIAGNOSIS — F43.23 SITUATIONAL MIXED ANXIETY AND DEPRESSIVE DISORDER: ICD-10-CM

## 2025-02-11 DIAGNOSIS — R79.89 LOW VITAMIN B12 LEVEL: ICD-10-CM

## 2025-02-11 DIAGNOSIS — E03.9 ACQUIRED HYPOTHYROIDISM: ICD-10-CM

## 2025-02-11 DIAGNOSIS — R63.5 WEIGHT GAIN: Primary | ICD-10-CM

## 2025-02-11 DIAGNOSIS — E66.812 CLASS 2 OBESITY DUE TO EXCESS CALORIES WITHOUT SERIOUS COMORBIDITY WITH BODY MASS INDEX (BMI) OF 39.0 TO 39.9 IN ADULT: ICD-10-CM

## 2025-02-11 DIAGNOSIS — E66.09 CLASS 2 OBESITY DUE TO EXCESS CALORIES WITHOUT SERIOUS COMORBIDITY WITH BODY MASS INDEX (BMI) OF 39.0 TO 39.9 IN ADULT: ICD-10-CM

## 2025-02-11 PROCEDURE — 99214 OFFICE O/P EST MOD 30 MIN: CPT | Performed by: PHYSICIAN ASSISTANT

## 2025-02-11 PROCEDURE — 1036F TOBACCO NON-USER: CPT | Performed by: PHYSICIAN ASSISTANT

## 2025-02-11 PROCEDURE — 3008F BODY MASS INDEX DOCD: CPT | Performed by: PHYSICIAN ASSISTANT

## 2025-02-11 RX ORDER — VILAZODONE HYDROCHLORIDE 10 MG/1
TABLET ORAL
Qty: 60 TABLET | Refills: 2 | Status: SHIPPED | OUTPATIENT
Start: 2025-02-11

## 2025-02-11 RX ORDER — ERGOCALCIFEROL 1.25 MG/1
CAPSULE ORAL
Qty: 9 CAPSULE | Refills: 11 | Status: SHIPPED | OUTPATIENT
Start: 2025-02-16

## 2025-02-11 RX ORDER — BUSPIRONE HYDROCHLORIDE 5 MG/1
5 TABLET ORAL 3 TIMES DAILY PRN
Qty: 90 TABLET | Refills: 0 | Status: SHIPPED | OUTPATIENT
Start: 2025-02-11 | End: 2026-02-11

## 2025-02-11 ASSESSMENT — ENCOUNTER SYMPTOMS
BRUISES/BLEEDS EASILY: 0
WHEEZING: 0
WOUND: 0
PALPITATIONS: 0
EYE DISCHARGE: 0
FLANK PAIN: 0
DYSPHORIC MOOD: 1
RHINORRHEA: 0
NECK PAIN: 0
DIZZINESS: 0
CONFUSION: 0
SLEEP DISTURBANCE: 0
ABDOMINAL PAIN: 0
SORE THROAT: 0
VOMITING: 0
TREMORS: 0
SHORTNESS OF BREATH: 0
NUMBNESS: 0
COUGH: 0
UNEXPECTED WEIGHT CHANGE: 1
FREQUENCY: 0
DIARRHEA: 0
SINUS PAIN: 0
FATIGUE: 1
NERVOUS/ANXIOUS: 1
BACK PAIN: 0
CONSTIPATION: 0
CHILLS: 0
CHEST TIGHTNESS: 0
FEVER: 0
EYE REDNESS: 0
NAUSEA: 0
HEADACHES: 0

## 2025-02-11 NOTE — PROGRESS NOTES
Subjective   Patient ID: Sandra Crouch is a 34 y.o. female who presents for Follow-up (FOLLOW LABS )    HPI    -Labs - done in DEC      - med check   herpes -stable on acyclovir prn.     Hypothyroid- inc dose but questioned if she felt worse so stopped the medicine all together x 3 -4 weeks but when she got her updated labs she restarted on the 50 mcg dose and has been taking faithfully since.  Denies any change in symptoms whether she was on /off meds so doesn't believe thyroid or thyroid med to be cause of her symptoms        mood  Anxiety and depression -celexa 40 and mood is stable but possible cause of wt gain - she cont to gain weight every 6 mo with is contributing to her mood and denies other known causes of wt gain     Wellbutrin - pt thinks she took years ago but thinks its not working   Buspar - thinks she may have been on in the past as well   Viibryd - denies being on - given her symptoms suggest we taper of celexa - 20 mg daily x 1 week then every other day x 1 week then stop while she starts viibryd 10 mg daily x 1 week then inc to 2 /week.  Will start buspar as well  She does have PMDD symptoms - consider prozac however again I am concerned with her weight gain - suggest updated labs as well        Wt gain x 3 years and denies change in diet and ex   Possible celexa triggered  Discussed dietician referral, adipex, injectable, bariatric, endo   Consider sleep study as well      Vit D - 1/week -   B12 - every other day      -Preventative Testing   PAP - Following with GYN last PAP -2022- [facility]ette Teresa - [place]  Mammo- fam hx of breast Cancer but in older age - suggest age 40   DEXA - suggest 55   Colonoscopy - Suggest age 45-50   Fall - No JFEB 2025   PHQ2 - No FEB 2025      -Other Providers   GYN - Lisette Teresa  EYE- visit set in Aug - follows annually   Dentist - every 6 months               Patient Active Problem List   Diagnosis    Borderline high cholesterol    Herpes simplex    Malaise  and fatigue    Migraine    Situational mixed anxiety and depressive disorder    Weight gain    Low vitamin B12 level    Vitamin D deficiency    Class 2 obesity due to excess calories without serious comorbidity with body mass index (BMI) of 38.0 to 38.9 in adult    Acquired hypothyroidism    Positive measurement finding       Review of Systems   Constitutional:  Positive for fatigue and unexpected weight change. Negative for chills and fever.   HENT:  Negative for congestion, rhinorrhea, sinus pain, sore throat and tinnitus.    Eyes:  Negative for discharge, redness and visual disturbance.   Respiratory:  Negative for cough, chest tightness, shortness of breath and wheezing.    Cardiovascular:  Negative for chest pain, palpitations and leg swelling.   Gastrointestinal:  Negative for abdominal pain, constipation, diarrhea, nausea and vomiting.   Endocrine: Negative for cold intolerance and heat intolerance.   Genitourinary:  Negative for flank pain, frequency and urgency.   Musculoskeletal:  Negative for back pain, gait problem and neck pain.   Skin:  Negative for rash and wound.   Neurological:  Negative for dizziness, tremors, syncope, numbness and headaches.   Hematological:  Does not bruise/bleed easily.   Psychiatric/Behavioral:  Positive for dysphoric mood. Negative for confusion, sleep disturbance and suicidal ideas. The patient is nervous/anxious.        Past Medical History:   Diagnosis Date    Acne 06/04/2024    Encounter for gynecological examination (general) (routine) without abnormal findings     Pap test, as part of routine gynecological examination    High thyroid stimulating hormone (TSH) level 02/29/2024    Hypercholesterolemia 02/29/2024    Other conditions influencing health status     Menarche    Other specified postprocedural states     History of Papanicolaou smear    Personal history of other diseases of the female genital tract     History of abnormal cervical Papanicolaou smear       Past  "Surgical History:   Procedure Laterality Date    COLPOSCOPY  03/29/2013       Family History   Problem Relation Name Age of Onset    Thyroid disease Mother      Diabetes type II Father      Emphysema Other      Breast cancer Other         Social History     Tobacco Use    Smoking status: Never    Smokeless tobacco: Never   Vaping Use    Vaping status: Never Used   Substance Use Topics    Alcohol use: Never    Drug use: Never       No Known Allergies    Current Outpatient Medications   Medication Sig Dispense Refill    acyclovir (Zovirax) 400 mg tablet Take 1 tablet (400 mg) by mouth. 1 TAB TID PRN      citalopram (CeleXA) 40 mg tablet Take 1 tablet (40 mg) by mouth once daily. 30 tablet 5    cyanocobalamin (Vitamin B-12) 1,000 mcg tablet Take 1 tab every other day 15 tablet 11    ergocalciferol (Vitamin D-2) 1.25 MG (35958 UT) capsule Take 1 capsule (50,000 Units) by mouth 1 (one) time per week. 5 capsule 11    levothyroxine (Synthroid, Levoxyl) 50 mcg tablet Take 1 tablet (50 mcg) by mouth early in the morning.. 30 tablet 11     No current facility-administered medications for this visit.       Objective   /71   Pulse 73   Ht 1.575 m (5' 2\")   Wt 98.3 kg (216 lb 12.8 oz)   SpO2 97%   BMI 39.65 kg/m²     Physical Exam  Vitals reviewed.   Constitutional:       Appearance: Normal appearance. She is obese.   HENT:      Head: Normocephalic.      Right Ear: External ear normal.      Left Ear: External ear normal.      Nose: Nose normal. No congestion or rhinorrhea.      Mouth/Throat:      Mouth: Mucous membranes are moist.   Eyes:      Extraocular Movements: Extraocular movements intact.      Conjunctiva/sclera: Conjunctivae normal.      Pupils: Pupils are equal, round, and reactive to light.   Cardiovascular:      Rate and Rhythm: Normal rate and regular rhythm.      Pulses: Normal pulses.   Pulmonary:      Effort: Pulmonary effort is normal.      Breath sounds: Normal breath sounds.   Abdominal:      " General: Bowel sounds are normal.      Palpations: Abdomen is soft.      Tenderness: There is no abdominal tenderness. There is no right CVA tenderness or left CVA tenderness.   Musculoskeletal:         General: No tenderness. Normal range of motion.      Cervical back: Normal range of motion and neck supple. No tenderness.   Skin:     General: Skin is warm and dry.   Neurological:      General: No focal deficit present.      Mental Status: She is alert and oriented to person, place, and time.   Psychiatric:         Mood and Affect: Mood normal.         Behavior: Behavior normal.       Testing   Component      Latest Ref Rng 12/30/2024   Thyroid Stimulating Hormone      0.44 - 3.98 mIU/L 4.45 (H)    Thyroxine, Free      0.61 - 1.12 ng/dL 0.48 (L)    Vitamin B12      211 - 911 pg/mL 608    Vitamin D, 25-Hydroxy, Total      30 - 100 ng/mL 25 (L)       TSH - keep dose at 50 mcg but needs to take routinely and will check in 1-2 mo   B12 - cont supplement   Vit D -inc to 2/week       Impression    MDM    1) COMPLEXITY: 1 UNDIAGNOSED NEW PROBLEM WITH UNCERTAIN PROGNOSIS  2)DATA: TESTS INTERPRETED AND OR ORDERED, TOOK INDEPENDENT HISTORY OR RECORDS REVIEWED  3)RISK: MODERATE RISK DUE TO NATURE OF MEDICAL CONDITIONS/COMORBIDITY OR MEDICATIONS ORDERED OR SURGICAL OR PROCEDURE REFERRAL, .       Reviewed labs and Testing on file   Patient to follow diet low in cholesterol, fat, and sodium.    Patient is advised to increase Exercise.  Patient is recommended to lose weight.  Reviewed Meds and discussed common side effects  Continue as directed   Patient is strongly advised to be compliant with recommendations.    Return to Clinic sooner if needed.  Patient denies further questions/concerns at this time     Assessment/Plan   Problem List Items Addressed This Visit             ICD-10-CM    Malaise and fatigue R53.81, R53.83    Situational mixed anxiety and depressive disorder F43.23    Relevant Medications    vilazodone (Viibryd)  10 mg tablet    busPIRone (Buspar) 5 mg tablet    Weight gain - Primary R63.5    Low vitamin B12 level R79.89    Relevant Orders    Vitamin B12    Vitamin D deficiency E55.9    Relevant Medications    ergocalciferol (Vitamin D-2) 1.25 MG (17267 UT) capsule (Start on 2/16/2025)    Other Relevant Orders    Vitamin D 25-Hydroxy,Total (for eval of Vitamin D levels)    Class 2 obesity due to excess calories without serious comorbidity with body mass index (BMI) of 39.0 to 39.9 in adult E66.812, E66.09, Z68.39    Acquired hypothyroidism E03.9    Relevant Orders    CBC and Auto Differential    Comprehensive Metabolic Panel    Lipid Panel    Thyroid Stimulating Hormone    Thyroxine, Free    Magnesium    Vitamin B12    Vitamin D 25-Hydroxy,Total (for eval of Vitamin D levels)        FU in 2 mo with labs at [facility] fasting and med check /mood check

## 2025-02-12 ENCOUNTER — TELEPHONE (OUTPATIENT)
Dept: PRIMARY CARE | Facility: CLINIC | Age: 35
End: 2025-02-12
Payer: COMMERCIAL

## 2025-02-13 DIAGNOSIS — J45.20 MILD INTERMITTENT ASTHMA WITHOUT COMPLICATION (HHS-HCC): Primary | ICD-10-CM

## 2025-02-13 RX ORDER — ALBUTEROL SULFATE 90 UG/1
2 INHALANT RESPIRATORY (INHALATION) EVERY 6 HOURS PRN
Qty: 18 G | Refills: 1 | Status: SHIPPED | OUTPATIENT
Start: 2025-02-13 | End: 2026-02-13

## 2025-04-02 LAB
25(OH)D3+25(OH)D2 SERPL-MCNC: 42 NG/ML (ref 30–100)
ALBUMIN SERPL-MCNC: 4.1 G/DL (ref 3.6–5.1)
ALP SERPL-CCNC: 54 U/L (ref 31–125)
ALT SERPL-CCNC: 20 U/L (ref 6–29)
ANION GAP SERPL CALCULATED.4IONS-SCNC: 9 MMOL/L (CALC) (ref 7–17)
AST SERPL-CCNC: 20 U/L (ref 10–30)
BASOPHILS # BLD AUTO: 69 CELLS/UL (ref 0–200)
BASOPHILS NFR BLD AUTO: 1.1 %
BILIRUB SERPL-MCNC: 0.2 MG/DL (ref 0.2–1.2)
BUN SERPL-MCNC: 16 MG/DL (ref 7–25)
CALCIUM SERPL-MCNC: 9.1 MG/DL (ref 8.6–10.2)
CHLORIDE SERPL-SCNC: 106 MMOL/L (ref 98–110)
CHOLEST SERPL-MCNC: 182 MG/DL
CHOLEST/HDLC SERPL: 3.4 (CALC)
CO2 SERPL-SCNC: 22 MMOL/L (ref 20–32)
CREAT SERPL-MCNC: 0.81 MG/DL (ref 0.5–0.97)
EGFRCR SERPLBLD CKD-EPI 2021: 98 ML/MIN/1.73M2
EOSINOPHIL # BLD AUTO: 378 CELLS/UL (ref 15–500)
EOSINOPHIL NFR BLD AUTO: 6 %
ERYTHROCYTE [DISTWIDTH] IN BLOOD BY AUTOMATED COUNT: 13 % (ref 11–15)
GLUCOSE SERPL-MCNC: 84 MG/DL (ref 65–99)
HCT VFR BLD AUTO: 40.2 % (ref 35–45)
HDLC SERPL-MCNC: 53 MG/DL
HGB BLD-MCNC: 13.1 G/DL (ref 11.7–15.5)
LDLC SERPL CALC-MCNC: 109 MG/DL (CALC)
LYMPHOCYTES # BLD AUTO: 1871 CELLS/UL (ref 850–3900)
LYMPHOCYTES NFR BLD AUTO: 29.7 %
MAGNESIUM SERPL-MCNC: 2.3 MG/DL (ref 1.5–2.5)
MCH RBC QN AUTO: 28.9 PG (ref 27–33)
MCHC RBC AUTO-ENTMCNC: 32.6 G/DL (ref 32–36)
MCV RBC AUTO: 88.5 FL (ref 80–100)
MONOCYTES # BLD AUTO: 498 CELLS/UL (ref 200–950)
MONOCYTES NFR BLD AUTO: 7.9 %
NEUTROPHILS # BLD AUTO: 3484 CELLS/UL (ref 1500–7800)
NEUTROPHILS NFR BLD AUTO: 55.3 %
NONHDLC SERPL-MCNC: 129 MG/DL (CALC)
PLATELET # BLD AUTO: 391 THOUSAND/UL (ref 140–400)
PMV BLD REES-ECKER: 10 FL (ref 7.5–12.5)
POTASSIUM SERPL-SCNC: 4.5 MMOL/L (ref 3.5–5.3)
PROT SERPL-MCNC: 6.8 G/DL (ref 6.1–8.1)
RBC # BLD AUTO: 4.54 MILLION/UL (ref 3.8–5.1)
SODIUM SERPL-SCNC: 137 MMOL/L (ref 135–146)
T4 FREE SERPL-MCNC: 0.9 NG/DL (ref 0.8–1.8)
TRIGL SERPL-MCNC: 105 MG/DL
TSH SERPL-ACNC: 3.06 MIU/L
VIT B12 SERPL-MCNC: 633 PG/ML (ref 200–1100)
WBC # BLD AUTO: 6.3 THOUSAND/UL (ref 3.8–10.8)

## 2025-04-03 ENCOUNTER — APPOINTMENT (OUTPATIENT)
Dept: PRIMARY CARE | Facility: CLINIC | Age: 35
End: 2025-04-03
Payer: COMMERCIAL

## 2025-04-03 VITALS
BODY MASS INDEX: 40.52 KG/M2 | DIASTOLIC BLOOD PRESSURE: 76 MMHG | HEART RATE: 80 BPM | WEIGHT: 220.2 LBS | HEIGHT: 62 IN | SYSTOLIC BLOOD PRESSURE: 128 MMHG

## 2025-04-03 DIAGNOSIS — F43.23 SITUATIONAL MIXED ANXIETY AND DEPRESSIVE DISORDER: ICD-10-CM

## 2025-04-03 DIAGNOSIS — E66.813 CLASS 3 SEVERE OBESITY DUE TO EXCESS CALORIES WITHOUT SERIOUS COMORBIDITY WITH BODY MASS INDEX (BMI) OF 40.0 TO 44.9 IN ADULT: ICD-10-CM

## 2025-04-03 DIAGNOSIS — E03.9 ACQUIRED HYPOTHYROIDISM: ICD-10-CM

## 2025-04-03 DIAGNOSIS — G47.10 HYPERSOMNIA: ICD-10-CM

## 2025-04-03 DIAGNOSIS — R63.5 WEIGHT GAIN: Primary | ICD-10-CM

## 2025-04-03 DIAGNOSIS — E66.01 CLASS 3 SEVERE OBESITY DUE TO EXCESS CALORIES WITHOUT SERIOUS COMORBIDITY WITH BODY MASS INDEX (BMI) OF 40.0 TO 44.9 IN ADULT: ICD-10-CM

## 2025-04-03 DIAGNOSIS — R06.83 SNORING: ICD-10-CM

## 2025-04-03 DIAGNOSIS — E55.9 VITAMIN D DEFICIENCY: ICD-10-CM

## 2025-04-03 PROCEDURE — 1036F TOBACCO NON-USER: CPT | Performed by: PHYSICIAN ASSISTANT

## 2025-04-03 PROCEDURE — 3008F BODY MASS INDEX DOCD: CPT | Performed by: PHYSICIAN ASSISTANT

## 2025-04-03 PROCEDURE — 99214 OFFICE O/P EST MOD 30 MIN: CPT | Performed by: PHYSICIAN ASSISTANT

## 2025-04-03 RX ORDER — BUSPIRONE HYDROCHLORIDE 5 MG/1
5 TABLET ORAL 3 TIMES DAILY PRN
Qty: 90 TABLET | Refills: 1 | Status: SHIPPED | OUTPATIENT
Start: 2025-04-03 | End: 2026-04-03

## 2025-04-03 RX ORDER — VILAZODONE HYDROCHLORIDE 40 MG/1
40 TABLET ORAL
Qty: 30 TABLET | Refills: 5 | Status: SHIPPED | OUTPATIENT
Start: 2025-04-03

## 2025-04-03 ASSESSMENT — ENCOUNTER SYMPTOMS
NERVOUS/ANXIOUS: 1
WOUND: 0
DIZZINESS: 0
CONFUSION: 0
BRUISES/BLEEDS EASILY: 0
CHILLS: 0
TREMORS: 0
NECK PAIN: 0
DIARRHEA: 0
BACK PAIN: 0
VOMITING: 0
COUGH: 0
SLEEP DISTURBANCE: 1
CHEST TIGHTNESS: 0
PALPITATIONS: 0
NUMBNESS: 0
HEADACHES: 0
RHINORRHEA: 0
CONSTIPATION: 0
EYE DISCHARGE: 0
FEVER: 0
EYE REDNESS: 0
FATIGUE: 1
SORE THROAT: 0
ABDOMINAL PAIN: 0
DYSPHORIC MOOD: 1
FREQUENCY: 0
FLANK PAIN: 0
NAUSEA: 0
SHORTNESS OF BREATH: 0
SINUS PAIN: 0
WHEEZING: 0

## 2025-04-03 ASSESSMENT — SLEEP AND FATIGUE QUESTIONNAIRES
HOW LIKELY ARE YOU TO NOD OFF OR FALL ASLEEP WHILE SITTING AND READING: WOULD NEVER DOZE
HOW LIKELY ARE YOU TO NOD OFF OR FALL ASLEEP IN A CAR, WHILE STOPPED FOR A FEW MINUTES IN TRAFFIC: WOULD NEVER DOZE
HOW LIKELY ARE YOU TO NOD OFF OR FALL ASLEEP WHILE LYING DOWN TO REST IN THE AFTERNOON WHEN CIRCUMSTANCES PERMIT: HIGH CHANCE OF DOZING
HOW LIKELY ARE YOU TO NOD OFF OR FALL ASLEEP WHILE SITTING AND TALKING TO SOMEONE: WOULD NEVER DOZE
ESS TOTAL SCORE: 5
HOW LIKELY ARE YOU TO NOD OFF OR FALL ASLEEP WHILE SITTING INACTIVE IN A PUBLIC PLACE: WOULD NEVER DOZE
HOW LIKELY ARE YOU TO NOD OFF OR FALL ASLEEP WHILE SITTING QUIETLY AFTER LUNCH WITHOUT ALCOHOL: SLIGHT CHANCE OF DOZING
HOW LIKELY ARE YOU TO NOD OFF OR FALL ASLEEP WHILE WATCHING TV: SLIGHT CHANCE OF DOZING
HOW LIKELY ARE YOU TO NOD OFF OR FALL ASLEEP WHEN YOU ARE A PASSENGER IN A CAR FOR AN HOUR WITHOUT A BREAK: WOULD NEVER DOZE

## 2025-04-03 NOTE — PROGRESS NOTES
Subjective   Patient ID: Sandra Crouch is a 34 y.o. female who presents for Follow-up (2 MO F/U WITH LABS MOOD AND MED CHECK)    HPI    Labs     - med check   herpes -stable on acyclovir prn.     Hypothyroid- stable on meds        mood  Anxiety and depression -  Celexa 40 - thought may have been the cause of wt gain over the last few years   Wellbutrin - pt thinks she took years ago but thinks its not working   Buspar - thinks she may have been on in the past as well   Viibryd - 20 mg and doing well but can use a boost- inc to 40       Wt gain x 3 years and denies change in diet and ex   Possible celexa triggered however she has cont to gain wt even tho off the med for 1-2 mo   Discussed dietician referral, adipex, injectable, bariatric, endo   Consider sleep study as well   Will check cortisol, start endo referral and set up sleep study   Consider PCOS or meds as discussed     Hypersomnia Snoring, BYRD  [place] - score of 5      Vit D - 1/week -   B12 - every other day      -Preventative Testing   PAP - Following with GYN last PAP -2022- Lisette Teresa - [facility]  Mammo- fam hx of breast Cancer but in older age - suggest age 40   DEXA - suggest 55   Colonoscopy - Suggest age 45-50   Fall - No JFEB 2025   PHQ2 - No FEB 2025      -Other Providers   GYN - Lisette Teresa  EYE- visit set in Aug - follows annually   Dentist - every 6 months         Patient Active Problem List   Diagnosis    Borderline high cholesterol    Herpes simplex    Malaise and fatigue    Migraine    Situational mixed anxiety and depressive disorder    Weight gain    Low vitamin B12 level    Vitamin D deficiency    Class 2 obesity due to excess calories without serious comorbidity with body mass index (BMI) of 39.0 to 39.9 in adult    Acquired hypothyroidism    Positive measurement finding       Review of Systems   Constitutional:  Positive for fatigue. Negative for chills and fever.   HENT:  Negative for congestion, rhinorrhea, sinus pain,  sore throat and tinnitus.    Eyes:  Negative for discharge, redness and visual disturbance.   Respiratory:  Negative for cough, chest tightness, shortness of breath and wheezing.    Cardiovascular:  Negative for chest pain, palpitations and leg swelling.   Gastrointestinal:  Negative for abdominal pain, constipation, diarrhea, nausea and vomiting.   Endocrine: Negative for cold intolerance and heat intolerance.   Genitourinary:  Negative for flank pain, frequency and urgency.   Musculoskeletal:  Negative for back pain, gait problem and neck pain.   Skin:  Negative for rash and wound.   Neurological:  Negative for dizziness, tremors, syncope, numbness and headaches.   Hematological:  Does not bruise/bleed easily.   Psychiatric/Behavioral:  Positive for dysphoric mood and sleep disturbance. Negative for confusion and suicidal ideas. The patient is nervous/anxious.        Past Medical History:   Diagnosis Date    Acne 06/04/2024    Encounter for gynecological examination (general) (routine) without abnormal findings     Pap test, as part of routine gynecological examination    High thyroid stimulating hormone (TSH) level 02/29/2024    Hypercholesterolemia 02/29/2024    Other conditions influencing health status     Menarche    Other specified postprocedural states     History of Papanicolaou smear    Personal history of other diseases of the female genital tract     History of abnormal cervical Papanicolaou smear       Past Surgical History:   Procedure Laterality Date    COLPOSCOPY  03/29/2013       Family History   Problem Relation Name Age of Onset    Thyroid disease Mother      Diabetes type II Father      Emphysema Other      Breast cancer Other         Social History     Tobacco Use    Smoking status: Never    Smokeless tobacco: Never   Vaping Use    Vaping status: Never Used   Substance Use Topics    Alcohol use: Never    Drug use: Never       No Known Allergies    Current Outpatient Medications   Medication Sig  "Dispense Refill    acyclovir (Zovirax) 400 mg tablet Take 1 tablet (400 mg) by mouth. 1 TAB TID PRN      albuterol (Ventolin HFA) 90 mcg/actuation inhaler Inhale 2 puffs every 6 hours if needed for wheezing or shortness of breath. 18 g 1    busPIRone (Buspar) 5 mg tablet Take 1 tablet (5 mg) by mouth 3 times a day as needed (anxiety). 90 tablet 0    cyanocobalamin (Vitamin B-12) 1,000 mcg tablet Take 1 tab every other day 15 tablet 11    ergocalciferol (Vitamin D-2) 1.25 MG (57087 UT) capsule Take 1 cap 2/week on separate days Do not fill before February 16, 2025. 9 capsule 11    levothyroxine (Synthroid, Levoxyl) 50 mcg tablet Take 1 tablet (50 mcg) by mouth early in the morning.. 30 tablet 11    vilazodone (Viibryd) 10 mg tablet Take 1 tab daily x 2 weeks then inc to 2 tab daily 60 tablet 2    citalopram (CeleXA) 40 mg tablet Take 1 tablet (40 mg) by mouth once daily. (Patient not taking: Reported on 4/3/2025) 30 tablet 5     No current facility-administered medications for this visit.       Objective   /76   Pulse 80   Ht 1.575 m (5' 2\")   Wt 99.9 kg (220 lb 3.2 oz)   BMI 40.28 kg/m²     Physical Exam  Vitals reviewed.   Constitutional:       Appearance: Normal appearance. She is obese.   HENT:      Head: Normocephalic.      Right Ear: External ear normal.      Left Ear: External ear normal.      Nose: Nose normal. No congestion or rhinorrhea.      Mouth/Throat:      Mouth: Mucous membranes are moist.   Eyes:      Extraocular Movements: Extraocular movements intact.      Conjunctiva/sclera: Conjunctivae normal.      Pupils: Pupils are equal, round, and reactive to light.   Cardiovascular:      Rate and Rhythm: Normal rate and regular rhythm.      Pulses: Normal pulses.   Pulmonary:      Effort: Pulmonary effort is normal.      Breath sounds: Normal breath sounds.   Abdominal:      General: Bowel sounds are normal.      Palpations: Abdomen is soft.      Tenderness: There is no abdominal tenderness. There " is no right CVA tenderness or left CVA tenderness.   Musculoskeletal:         General: No tenderness. Normal range of motion.      Cervical back: Normal range of motion and neck supple. No tenderness.   Skin:     General: Skin is warm and dry.   Neurological:      General: No focal deficit present.      Mental Status: She is alert and oriented to person, place, and time.   Psychiatric:         Mood and Affect: Mood normal.         Behavior: Behavior normal.       Testing   Component      Latest Ref [facility] 4/1/2025   WHITE BLOOD CELL COUNT      3.8 - 10.8 Thousand/uL 6.3    RED BLOOD CELL COUNT      3.80 - 5.10 Million/uL 4.54    HEMOGLOBIN      11.7 - 15.5 g/dL 13.1    HEMATOCRIT      35.0 - 45.0 % 40.2    MCV      80.0 - 100.0 fL 88.5    MCH      27.0 - 33.0 pg 28.9    MCHC      32.0 - 36.0 g/dL 32.6    RDW      11.0 - 15.0 % 13.0    PLATELET COUNT      140 - 400 Thousand/uL 391    MPV      7.5 - 12.5 fL 10.0    ABSOLUTE NEUTROPHILS      1,500 - 7,800 cells/uL 3,484    ABSOLUTE LYMPHOCYTES      850 - 3,900 cells/uL 1,871    ABSOLUTE MONOCYTES      200 - 950 cells/uL 498    ABSOLUTE EOSINOPHILS      15 - 500 cells/uL 378    ABSOLUTE BASOPHILS      0 - 200 cells/uL 69    NEUTROPHILS      % 55.3    LYMPHOCYTES      % 29.7    MONOCYTES      % 7.9    EOSINOPHILS      % 6.0    BASOPHILS      % 1.1    GLUCOSE      65 - 99 mg/dL 84    UREA NITROGEN (BUN)      7 - 25 mg/dL 16    CREATININE      0.50 - 0.97 mg/dL 0.81    EGFR      > OR = 60 mL/min/1.73m2 98    SODIUM      135 - 146 mmol/L 137    POTASSIUM      3.5 - 5.3 mmol/L 4.5    CHLORIDE      98 - 110 mmol/L 106    CARBON DIOXIDE      20 - 32 mmol/L 22    ELECTROLYTE BALANCE      7 - 17 mmol/L (calc) 9    CALCIUM      8.6 - 10.2 mg/dL 9.1    PROTEIN, TOTAL      6.1 - 8.1 g/dL 6.8    ALBUMIN      3.6 - 5.1 g/dL 4.1    BILIRUBIN, TOTAL      0.2 - 1.2 mg/dL 0.2    ALKALINE PHOSPHATASE      31 - 125 U/L 54    AST      10 - 30 U/L 20    ALT      6 - 29 U/L 20    CHOLESTEROL,  TOTAL      <200 mg/dL 182    HDL CHOLESTEROL      > OR = 50 mg/dL 53    TRIGLYCERIDES      <150 mg/dL 105    LDL-CHOLESTEROL      mg/dL (calc) 109 (H)    CHOL/HDLC RATIO      <5.0 (calc) 3.4    NON HDL CHOLESTEROL      <130 mg/dL (calc) 129    TSH      mIU/L 3.06    T4, FREE      0.8 - 1.8 ng/dL 0.9    MAGNESIUM      1.5 - 2.5 mg/dL 2.3    VITAMIN B12      200 - 1,100 pg/mL 633    VITAMIN D,25-OH,TOTAL,IA      30 - 100 ng/mL 42       Impression    MDM    1) COMPLEXITY: MORE THAN 1 STABLE CHRONIC CONDITION ADDRESSED  2)DATA: TESTS INTERPRETED AND OR ORDERED, TOOK INDEPENDENT HISTORY OR RECORDS REVIEWED  3)RISK: MODERATE RISK DUE TO NATURE OF MEDICAL CONDITIONS/COMORBIDITY OR MEDICATIONS ORDERED OR SURGICAL OR PROCEDURE REFERRAL, .       Reviewed labs and Testing on file   Patient to follow diet low in cholesterol, fat, and sodium.    Patient is advised to increase Exercise.  Patient is recommended to lose weight.  Reviewed Meds and discussed common side effects  Continue as directed   Patient is strongly advised to be compliant with recommendations.    Return to Clinic sooner if needed.  Patient denies further questions/concerns at this time     Assessment/Plan   Problem List Items Addressed This Visit             ICD-10-CM    Situational mixed anxiety and depressive disorder F43.23    Relevant Medications    vilazodone (Viibryd) 40 mg tablet    busPIRone (Buspar) 5 mg tablet    Weight gain - Primary R63.5    Relevant Orders    Cortisol AM    Referral to Endocrinology    Vitamin D deficiency E55.9    Class 3 severe obesity due to excess calories without serious comorbidity with body mass index (BMI) of 40.0 to 44.9 in adult E66.813, E66.01, Z68.41    Relevant Orders    Cortisol AM    Referral to Endocrinology    Acquired hypothyroidism E03.9    Relevant Orders    Referral to Endocrinology     Other Visit Diagnoses         Codes    Hypersomnia     G47.10    Relevant Orders    Home sleep apnea test (HSAT)    Snoring      R06.83    Relevant Orders    Home sleep apnea test (HSAT)            FU in 1-2 mo with testing and cortisol level   Referral to endo - hypothyroid, weight gain, r/o cushing or other hormonal concerns   Sleep study

## 2025-04-08 ENCOUNTER — TELEPHONE (OUTPATIENT)
Dept: PRIMARY CARE | Facility: CLINIC | Age: 35
End: 2025-04-08
Payer: COMMERCIAL

## 2025-04-08 NOTE — TELEPHONE ENCOUNTER
"REI FROM [facility] CALLED REGARDING REFERRAL THEY RECEIVED. SHE SAID THAT THEY WON'T SEE PATIENT FOR WEIGHT MANAGEMENT. I EXPLAINED THAT REFERRAL WAS ALSO FOR HER THYROID, AND TO R/O HORMONAL SUCH as PCOS AND CUSHINGS. SHE SAID PCOS WOULD STILL FALL UNDER WEIGHT MANAGEMENT AND THEY DON'T SCREEN FOR THINGS SUCH as CUSHINGS. IF YOU WOULD LIKE TO ORDER TESTING FOR CUSHINGS AND CONFIRM THE DX, THEN THEY WOULD SEE PATIENT, BUT ALL OTHER DX YOU ARE USING FOR REFERRAL WOULD STILL BE CONSIDERED WEIGHT MANAGEMENT ISSUES. SHE SUGGESTED REFERRAL TO [facility] \"[facility]\" MANAGEMENT PROGRAM.   "

## 2025-04-10 NOTE — TELEPHONE ENCOUNTER
Pt wants to do weight management give that a try first. Do you still want pt to get cortisol  level checked ?

## 2025-04-11 NOTE — TELEPHONE ENCOUNTER
I would still recommend doing cortisol level to r/o those issues  We can do referral to the why wait program - should this be placed under referral to dietician ? Or faxed

## 2025-04-11 NOTE — TELEPHONE ENCOUNTER
PATIENT NOTIFIED VIA VOICEMAIL.   THE [facility] WE[facility] PROGRAM IS A MEDICALLY ASSISTED WEIGHT LOSS PROGRAM WITH THE DIETICIAN, SO I WOULD ASSUME A DIETICIAN REFERRAL WOULD BE APPROPRIATE.

## 2025-04-15 ENCOUNTER — APPOINTMENT (OUTPATIENT)
Dept: PRIMARY CARE | Facility: CLINIC | Age: 35
End: 2025-04-15
Payer: COMMERCIAL

## 2025-04-23 ENCOUNTER — HOSPITAL ENCOUNTER (OUTPATIENT)
Dept: HOSPITAL 100 - NS | Age: 35
LOS: 7 days | End: 2025-04-30
Payer: COMMERCIAL

## 2025-04-23 ENCOUNTER — APPOINTMENT (OUTPATIENT)
Dept: PRIMARY CARE | Facility: CLINIC | Age: 35
End: 2025-04-23
Payer: COMMERCIAL

## 2025-04-23 DIAGNOSIS — Z71.3: Primary | ICD-10-CM

## 2025-04-23 DIAGNOSIS — E66.813: ICD-10-CM

## 2025-04-23 PROCEDURE — 97802 MEDICAL NUTRITION INDIV IN: CPT

## 2025-04-29 ENCOUNTER — APPOINTMENT (OUTPATIENT)
Dept: SLEEP MEDICINE | Facility: HOSPITAL | Age: 35
End: 2025-04-29
Payer: COMMERCIAL

## 2025-05-28 ENCOUNTER — APPOINTMENT (OUTPATIENT)
Dept: PRIMARY CARE | Facility: CLINIC | Age: 35
End: 2025-05-28
Payer: COMMERCIAL

## 2025-05-31 LAB — CORTIS AM PEAK SERPL-MCNC: 12.7 MCG/DL

## 2025-06-05 ENCOUNTER — APPOINTMENT (OUTPATIENT)
Dept: PRIMARY CARE | Facility: CLINIC | Age: 35
End: 2025-06-05
Payer: COMMERCIAL

## 2025-06-05 VITALS
OXYGEN SATURATION: 96 % | BODY MASS INDEX: 39.53 KG/M2 | WEIGHT: 214.8 LBS | HEIGHT: 62 IN | HEART RATE: 71 BPM | SYSTOLIC BLOOD PRESSURE: 103 MMHG | DIASTOLIC BLOOD PRESSURE: 73 MMHG

## 2025-06-05 DIAGNOSIS — E66.09 CLASS 2 OBESITY DUE TO EXCESS CALORIES WITHOUT SERIOUS COMORBIDITY WITH BODY MASS INDEX (BMI) OF 39.0 TO 39.9 IN ADULT: ICD-10-CM

## 2025-06-05 DIAGNOSIS — Z71.3 DIETARY COUNSELING AND SURVEILLANCE: Primary | ICD-10-CM

## 2025-06-05 DIAGNOSIS — F43.23 SITUATIONAL MIXED ANXIETY AND DEPRESSIVE DISORDER: ICD-10-CM

## 2025-06-05 DIAGNOSIS — E55.9 VITAMIN D DEFICIENCY: ICD-10-CM

## 2025-06-05 DIAGNOSIS — E78.00 HYPERCHOLESTEREMIA: ICD-10-CM

## 2025-06-05 DIAGNOSIS — E66.812 CLASS 2 OBESITY DUE TO EXCESS CALORIES WITHOUT SERIOUS COMORBIDITY WITH BODY MASS INDEX (BMI) OF 39.0 TO 39.9 IN ADULT: ICD-10-CM

## 2025-06-05 DIAGNOSIS — E03.9 ACQUIRED HYPOTHYROIDISM: ICD-10-CM

## 2025-06-05 PROCEDURE — 1036F TOBACCO NON-USER: CPT | Performed by: PHYSICIAN ASSISTANT

## 2025-06-05 PROCEDURE — 3008F BODY MASS INDEX DOCD: CPT | Performed by: PHYSICIAN ASSISTANT

## 2025-06-05 PROCEDURE — 99214 OFFICE O/P EST MOD 30 MIN: CPT | Performed by: PHYSICIAN ASSISTANT

## 2025-06-05 ASSESSMENT — ENCOUNTER SYMPTOMS
WHEEZING: 0
BRUISES/BLEEDS EASILY: 0
DIARRHEA: 0
HEADACHES: 0
PALPITATIONS: 0
FATIGUE: 1
NECK PAIN: 0
NUMBNESS: 0
FEVER: 0
TREMORS: 0
CONFUSION: 0
SINUS PAIN: 0
RHINORRHEA: 0
FLANK PAIN: 0
SLEEP DISTURBANCE: 0
DIZZINESS: 0
FREQUENCY: 0
VOMITING: 0
CONSTIPATION: 0
CHEST TIGHTNESS: 0
NAUSEA: 0
ABDOMINAL PAIN: 0
EYE REDNESS: 0
BACK PAIN: 0
CHILLS: 0
EYE DISCHARGE: 0
SORE THROAT: 0
COUGH: 0
SHORTNESS OF BREATH: 0
WOUND: 0

## 2025-06-05 NOTE — PROGRESS NOTES
Subjective   Patient ID: Sandra Crouch is a 34 y.o. female who presents for Follow-up (1-2 month fu testing checking cortisol levels )    HPI    Labs- cortisol was WNl     - med check   herpes -stable on acyclovir prn.     Hypothyroid- stable on meds        mood  Anxiety and depression -  Celexa 40 - thought may have been the cause of wt gain over the last few years   Wellbutrin - pt thinks she took years ago but thinks its not working   Buspar - taking 1 daily and more as needed   Viibryd - 20 mg and doing well but can use a boost- inc to 40       Wt gain x 3 years and denies change in diet and ex   Possible celexa triggered however she has cont to gain wt even tho off the med for 1-2 mo   Discussed dietician referral, adipex, injectable, bariatric, endo   Consider sleep study as well   Will check cortisol, start endo referral and set up sleep study   Consider PCOS or meds as discussed   _______  cortisol was WNL and states could not find endo willing to see her for these symptoms  She is following with the [facility] clinic in [place]   She is following their food list   She is now ex more as well   Next visit is set end of this month   She has lost 5-6 lb in 2 mo and is doing well      Hypersomnia Snoring, BYRD  [place] - score of 5   Pt declines wanting to pursue sleep study at this time as she is seeing some success with diet and ex      Vit D - 1/week -   B12 - every other day        Problem List[1]    Review of Systems   Constitutional:  Positive for fatigue. Negative for chills and fever.   HENT:  Negative for congestion, rhinorrhea, sinus pain, sore throat and tinnitus.    Eyes:  Negative for discharge, redness and visual disturbance.   Respiratory:  Negative for cough, chest tightness, shortness of breath and wheezing.    Cardiovascular:  Negative for chest pain, palpitations and leg swelling.   Gastrointestinal:  Negative for abdominal pain, constipation, diarrhea, nausea and vomiting.   Endocrine:  "Negative for cold intolerance and heat intolerance.   Genitourinary:  Negative for flank pain, frequency and urgency.   Musculoskeletal:  Negative for back pain, gait problem and neck pain.   Skin:  Negative for rash and wound.   Neurological:  Negative for dizziness, tremors, syncope, numbness and headaches.   Hematological:  Does not bruise/bleed easily.   Psychiatric/Behavioral:  Negative for confusion, sleep disturbance and suicidal ideas.        Medical History[2]    Surgical History[3]    Family History[4]    Social History[5]    Allergies[6]    Current Medications[7]    Objective   /73   Pulse 71   Ht 1.575 m (5' 2\")   Wt 97.4 kg (214 lb 12.8 oz)   SpO2 96%   BMI 39.29 kg/m²     Physical Exam  Vitals reviewed.   Constitutional:       Appearance: Normal appearance. She is obese.   HENT:      Head: Normocephalic.      Right Ear: External ear normal.      Left Ear: External ear normal.      Nose: Nose normal. No congestion or rhinorrhea.      Mouth/Throat:      Mouth: Mucous membranes are moist.   Eyes:      Extraocular Movements: Extraocular movements intact.      Conjunctiva/sclera: Conjunctivae normal.      Pupils: Pupils are equal, round, and reactive to light.   Cardiovascular:      Rate and Rhythm: Normal rate and regular rhythm.      Pulses: Normal pulses.   Pulmonary:      Effort: Pulmonary effort is normal.      Breath sounds: Normal breath sounds.   Abdominal:      General: Bowel sounds are normal.      Palpations: Abdomen is soft.      Tenderness: There is no abdominal tenderness. There is no right CVA tenderness or left CVA tenderness.   Musculoskeletal:         General: No tenderness. Normal range of motion.      Cervical back: Normal range of motion and neck supple. No tenderness.   Skin:     General: Skin is warm and dry.   Neurological:      General: No focal deficit present.      Mental Status: She is alert and oriented to person, place, and time.   Psychiatric:         Mood and " Affect: Mood normal.         Behavior: Behavior normal.     Testing   Cortisol - Wnl  Reviewed other labs on file and approp       Impression    MDM    1) COMPLEXITY: MORE THAN 1 STABLE CHRONIC CONDITION ADDRESSED  2)DATA: TESTS INTERPRETED AND OR ORDERED, TOOK INDEPENDENT HISTORY OR RECORDS REVIEWED  3)RISK: MODERATE RISK DUE TO NATURE OF MEDICAL CONDITIONS/COMORBIDITY OR MEDICATIONS ORDERED OR SURGICAL OR PROCEDURE REFERRAL, .       Reviewed labs and Testing on file   Patient to follow diet low in cholesterol, fat, and sodium.    Patient is advised to increase Exercise.  Patient is recommended to lose weight.  Reviewed Meds and discussed common side effects  Continue as directed   Patient is strongly advised to be compliant with recommendations.    Return to Clinic sooner if needed.  Patient denies further questions/concerns at this time     Assessment/Plan   Problem List Items Addressed This Visit           ICD-10-CM    Situational mixed anxiety and depressive disorder F43.23    Vitamin D deficiency E55.9    Relevant Orders    Vitamin D 25-Hydroxy,Total (for eval of Vitamin D levels)    Class 2 obesity due to excess calories without serious comorbidity with body mass index (BMI) of 39.0 to 39.9 in adult E66.812, E66.09, Z68.39    Acquired hypothyroidism E03.9    Relevant Orders    Thyroid Stimulating Hormone    Thyroxine, Free     Other Visit Diagnoses         Codes      Dietary counseling and surveillance    -  Primary Z71.3      Hypercholesteremia     E78.00    Relevant Orders    CBC and Auto Differential    Comprehensive Metabolic Panel    Lipid Panel    Thyroid Stimulating Hormone    Thyroxine, Free    Vitamin D 25-Hydroxy,Total (for eval of Vitamin D levels)             FU in oct with labs and med check          [1]   Patient Active Problem List  Diagnosis    Borderline high cholesterol    Herpes simplex    Malaise and fatigue    Migraine    Situational mixed anxiety and depressive disorder    Weight gain     Low vitamin B12 level    Vitamin D deficiency    Class 3 severe obesity due to excess calories without serious comorbidity with body mass index (BMI) of 40.0 to 44.9 in adult    Acquired hypothyroidism    Positive measurement finding   [2]   Past Medical History:  Diagnosis Date    Acne 06/04/2024    Encounter for gynecological examination (general) (routine) without abnormal findings     Pap test, as part of routine gynecological examination    High thyroid stimulating hormone (TSH) level 02/29/2024    Hypercholesterolemia 02/29/2024    Other conditions influencing health status     Menarche    Other specified postprocedural states     History of Papanicolaou smear    Personal history of other diseases of the female genital tract     History of abnormal cervical Papanicolaou smear   [3]   Past Surgical History:  Procedure Laterality Date    COLPOSCOPY  03/29/2013   [4]   Family History  Problem Relation Name Age of Onset    Thyroid disease Mother      Diabetes type II Father      Emphysema Other      Breast cancer Other     [5]   Social History  Tobacco Use    Smoking status: Never    Smokeless tobacco: Never   Vaping Use    Vaping status: Never Used   Substance Use Topics    Alcohol use: Never    Drug use: Never   [6] No Known Allergies  [7]   Current Outpatient Medications   Medication Sig Dispense Refill    acyclovir (Zovirax) 400 mg tablet Take 1 tablet (400 mg) by mouth. 1 TAB TID PRN      albuterol (Ventolin HFA) 90 mcg/actuation inhaler Inhale 2 puffs every 6 hours if needed for wheezing or shortness of breath. 18 g 1    busPIRone (Buspar) 5 mg tablet Take 1 tablet (5 mg) by mouth 3 times a day as needed (anxiety). 90 tablet 1    cyanocobalamin (Vitamin B-12) 1,000 mcg tablet Take 1 tab every other day 15 tablet 11    ergocalciferol (Vitamin D-2) 1.25 MG (62276 UT) capsule Take 1 cap 2/week on separate days Do not fill before February 16, 2025. 9 capsule 11    levothyroxine (Synthroid, Levoxyl) 50 mcg tablet  Take 1 tablet (50 mcg) by mouth early in the morning.. 30 tablet 11    vilazodone (Viibryd) 40 mg tablet Take 1 tablet (40 mg) by mouth once daily with breakfast. 30 tablet 5     No current facility-administered medications for this visit.

## 2025-06-07 DIAGNOSIS — R79.89 LOW VITAMIN B12 LEVEL: ICD-10-CM

## 2025-06-09 RX ORDER — LANOLIN ALCOHOL/MO/W.PET/CERES
1000 CREAM (GRAM) TOPICAL EVERY OTHER DAY
Qty: 15 TABLET | Refills: 11 | Status: SHIPPED | OUTPATIENT
Start: 2025-06-09

## 2025-08-07 ENCOUNTER — TELEPHONE (OUTPATIENT)
Dept: PRIMARY CARE | Facility: CLINIC | Age: 35
End: 2025-08-07
Payer: COMMERCIAL

## 2025-08-07 DIAGNOSIS — E66.812 CLASS 2 OBESITY DUE TO EXCESS CALORIES WITHOUT SERIOUS COMORBIDITY WITH BODY MASS INDEX (BMI) OF 39.0 TO 39.9 IN ADULT: Primary | ICD-10-CM

## 2025-08-07 DIAGNOSIS — E78.00 HYPERCHOLESTEREMIA: ICD-10-CM

## 2025-08-07 DIAGNOSIS — E03.9 ACQUIRED HYPOTHYROIDISM: ICD-10-CM

## 2025-08-07 DIAGNOSIS — Z71.3 DIETARY COUNSELING AND SURVEILLANCE: ICD-10-CM

## 2025-08-07 DIAGNOSIS — E66.09 CLASS 2 OBESITY DUE TO EXCESS CALORIES WITHOUT SERIOUS COMORBIDITY WITH BODY MASS INDEX (BMI) OF 39.0 TO 39.9 IN ADULT: Primary | ICD-10-CM

## 2025-08-07 NOTE — TELEPHONE ENCOUNTER
She is currently following with [place] [facility] - I take it she is not happy there?  Consider sleep study   Consider Visit with MMT to discuss adipex   Consider Referral to bariatric provider   Let me know if she has a preference on the next step   Thanks

## 2025-10-06 ENCOUNTER — APPOINTMENT (OUTPATIENT)
Dept: PRIMARY CARE | Facility: CLINIC | Age: 35
End: 2025-10-06
Payer: COMMERCIAL